# Patient Record
Sex: MALE | Race: OTHER
[De-identification: names, ages, dates, MRNs, and addresses within clinical notes are randomized per-mention and may not be internally consistent; named-entity substitution may affect disease eponyms.]

---

## 2018-06-18 ENCOUNTER — HOSPITAL ENCOUNTER (INPATIENT)
Dept: HOSPITAL 15 - ER | Age: 83
LOS: 3 days | Discharge: HOME | DRG: 291 | End: 2018-06-21
Attending: INTERNAL MEDICINE | Admitting: INTERNAL MEDICINE
Payer: MEDICARE

## 2018-06-18 VITALS — DIASTOLIC BLOOD PRESSURE: 74 MMHG | SYSTOLIC BLOOD PRESSURE: 131 MMHG

## 2018-06-18 VITALS — WEIGHT: 98.99 LBS | BODY MASS INDEX: 19.43 KG/M2 | HEIGHT: 60 IN

## 2018-06-18 VITALS — SYSTOLIC BLOOD PRESSURE: 141 MMHG | DIASTOLIC BLOOD PRESSURE: 67 MMHG

## 2018-06-18 DIAGNOSIS — I69.351: ICD-10-CM

## 2018-06-18 DIAGNOSIS — I25.2: ICD-10-CM

## 2018-06-18 DIAGNOSIS — I70.0: ICD-10-CM

## 2018-06-18 DIAGNOSIS — D63.8: ICD-10-CM

## 2018-06-18 DIAGNOSIS — N18.3: ICD-10-CM

## 2018-06-18 DIAGNOSIS — J96.00: ICD-10-CM

## 2018-06-18 DIAGNOSIS — R00.1: ICD-10-CM

## 2018-06-18 DIAGNOSIS — E44.0: ICD-10-CM

## 2018-06-18 DIAGNOSIS — Z88.2: ICD-10-CM

## 2018-06-18 DIAGNOSIS — E78.5: ICD-10-CM

## 2018-06-18 DIAGNOSIS — N39.0: ICD-10-CM

## 2018-06-18 DIAGNOSIS — Z88.8: ICD-10-CM

## 2018-06-18 DIAGNOSIS — Z95.5: ICD-10-CM

## 2018-06-18 DIAGNOSIS — Z79.899: ICD-10-CM

## 2018-06-18 DIAGNOSIS — I25.5: ICD-10-CM

## 2018-06-18 DIAGNOSIS — I50.43: Primary | ICD-10-CM

## 2018-06-18 DIAGNOSIS — I73.9: ICD-10-CM

## 2018-06-18 DIAGNOSIS — Z79.82: ICD-10-CM

## 2018-06-18 DIAGNOSIS — I25.10: ICD-10-CM

## 2018-06-18 DIAGNOSIS — B96.20: ICD-10-CM

## 2018-06-18 DIAGNOSIS — J44.1: ICD-10-CM

## 2018-06-18 LAB
ALBUMIN SERPL-MCNC: 3.3 G/DL (ref 3.4–5)
ALP SERPL-CCNC: 122 U/L (ref 45–117)
ALT SERPL-CCNC: 48 U/L (ref 16–61)
ANION GAP SERPL CALCULATED.3IONS-SCNC: 10 MMOL/L (ref 5–15)
APTT PPP: 32.3 SEC (ref 23.78–33.04)
BILIRUB SERPL-MCNC: 0.5 MG/DL (ref 0.2–1)
BUN SERPL-MCNC: 16 MG/DL (ref 7–18)
BUN/CREAT SERPL: 12.4
CALCIUM SERPL-MCNC: 8.7 MG/DL (ref 8.5–10.1)
CHLORIDE SERPL-SCNC: 110 MMOL/L (ref 98–107)
CO2 SERPL-SCNC: 22 MMOL/L (ref 21–32)
GLUCOSE SERPL-MCNC: 97 MG/DL (ref 74–106)
HCT VFR BLD AUTO: 37.9 % (ref 41–53)
HGB BLD-MCNC: 12.5 G/DL (ref 13.5–17.5)
INR PPP: 1.04 (ref 0.9–1.15)
MCH RBC QN AUTO: 29 PG (ref 28–32)
MCV RBC AUTO: 88.2 FL (ref 80–100)
NRBC BLD QL AUTO: 0.1 %
POTASSIUM SERPL-SCNC: 4.1 MMOL/L (ref 3.5–5.1)
PROT SERPL-MCNC: 7.3 G/DL (ref 6.4–8.2)
PROTHROMBIN TIME: 11.1 SEC (ref 9.27–12.13)
SODIUM SERPL-SCNC: 142 MMOL/L (ref 136–145)

## 2018-06-18 PROCEDURE — 81001 URINALYSIS AUTO W/SCOPE: CPT

## 2018-06-18 PROCEDURE — 71045 X-RAY EXAM CHEST 1 VIEW: CPT

## 2018-06-18 PROCEDURE — 85025 COMPLETE CBC W/AUTO DIFF WBC: CPT

## 2018-06-18 PROCEDURE — 93306 TTE W/DOPPLER COMPLETE: CPT

## 2018-06-18 PROCEDURE — 87088 URINE BACTERIA CULTURE: CPT

## 2018-06-18 PROCEDURE — 87086 URINE CULTURE/COLONY COUNT: CPT

## 2018-06-18 PROCEDURE — 36600 WITHDRAWAL OF ARTERIAL BLOOD: CPT

## 2018-06-18 PROCEDURE — 93005 ELECTROCARDIOGRAM TRACING: CPT

## 2018-06-18 PROCEDURE — 94640 AIRWAY INHALATION TREATMENT: CPT

## 2018-06-18 PROCEDURE — 87186 SC STD MICRODIL/AGAR DIL: CPT

## 2018-06-18 PROCEDURE — 85610 PROTHROMBIN TIME: CPT

## 2018-06-18 PROCEDURE — 78582 LUNG VENTILAT&PERFUS IMAGING: CPT

## 2018-06-18 PROCEDURE — 96375 TX/PRO/DX INJ NEW DRUG ADDON: CPT

## 2018-06-18 PROCEDURE — 80048 BASIC METABOLIC PNL TOTAL CA: CPT

## 2018-06-18 PROCEDURE — 36415 COLL VENOUS BLD VENIPUNCTURE: CPT

## 2018-06-18 PROCEDURE — 71046 X-RAY EXAM CHEST 2 VIEWS: CPT

## 2018-06-18 PROCEDURE — 83880 ASSAY OF NATRIURETIC PEPTIDE: CPT

## 2018-06-18 PROCEDURE — 85730 THROMBOPLASTIN TIME PARTIAL: CPT

## 2018-06-18 PROCEDURE — 82805 BLOOD GASES W/O2 SATURATION: CPT

## 2018-06-18 PROCEDURE — 80053 COMPREHEN METABOLIC PANEL: CPT

## 2018-06-18 PROCEDURE — 84484 ASSAY OF TROPONIN QUANT: CPT

## 2018-06-18 PROCEDURE — 96365 THER/PROPH/DIAG IV INF INIT: CPT

## 2018-06-18 RX ADMIN — SPIRONOLACTONE SCH MG: 25 TABLET, FILM COATED ORAL at 22:12

## 2018-06-18 RX ADMIN — ATORVASTATIN CALCIUM SCH MG: 20 TABLET, FILM COATED ORAL at 22:13

## 2018-06-18 RX ADMIN — SODIUM CHLORIDE SCH ML: 9 INJECTION INTRAMUSCULAR; INTRAVENOUS; SUBCUTANEOUS at 22:12

## 2018-06-18 RX ADMIN — TICAGRELOR SCH MG: 90 TABLET ORAL at 22:13

## 2018-06-18 RX ADMIN — ENALAPRIL MALEATE SCH MG: 2.5 TABLET ORAL at 22:13

## 2018-06-19 VITALS — DIASTOLIC BLOOD PRESSURE: 75 MMHG | SYSTOLIC BLOOD PRESSURE: 131 MMHG

## 2018-06-19 VITALS — DIASTOLIC BLOOD PRESSURE: 70 MMHG | SYSTOLIC BLOOD PRESSURE: 128 MMHG

## 2018-06-19 VITALS — DIASTOLIC BLOOD PRESSURE: 63 MMHG | SYSTOLIC BLOOD PRESSURE: 128 MMHG

## 2018-06-19 VITALS — SYSTOLIC BLOOD PRESSURE: 122 MMHG | DIASTOLIC BLOOD PRESSURE: 59 MMHG

## 2018-06-19 LAB
ALBUMIN SERPL-MCNC: 3.2 G/DL (ref 3.4–5)
ALP SERPL-CCNC: 108 U/L (ref 45–117)
ALT SERPL-CCNC: 39 U/L (ref 16–61)
ANION GAP SERPL CALCULATED.3IONS-SCNC: 10 MMOL/L (ref 5–15)
ANION GAP SERPL CALCULATED.3IONS-SCNC: 10 MMOL/L (ref 5–15)
BILIRUB SERPL-MCNC: 0.6 MG/DL (ref 0.2–1)
BUN SERPL-MCNC: 17 MG/DL (ref 7–18)
BUN SERPL-MCNC: 18 MG/DL (ref 7–18)
BUN/CREAT SERPL: 14
BUN/CREAT SERPL: 15.2
CALCIUM SERPL-MCNC: 8.3 MG/DL (ref 8.5–10.1)
CALCIUM SERPL-MCNC: 8.5 MG/DL (ref 8.5–10.1)
CHLORIDE SERPL-SCNC: 106 MMOL/L (ref 98–107)
CHLORIDE SERPL-SCNC: 109 MMOL/L (ref 98–107)
CO2 SERPL-SCNC: 22 MMOL/L (ref 21–32)
CO2 SERPL-SCNC: 23 MMOL/L (ref 21–32)
GLUCOSE SERPL-MCNC: 89 MG/DL (ref 74–106)
GLUCOSE SERPL-MCNC: 90 MG/DL (ref 74–106)
HCT VFR BLD AUTO: 35.6 % (ref 41–53)
HGB BLD-MCNC: 12 G/DL (ref 13.5–17.5)
MCH RBC QN AUTO: 29.3 PG (ref 28–32)
MCV RBC AUTO: 87.3 FL (ref 80–100)
NRBC BLD QL AUTO: 0 %
POTASSIUM SERPL-SCNC: 3.6 MMOL/L (ref 3.5–5.1)
POTASSIUM SERPL-SCNC: 3.8 MMOL/L (ref 3.5–5.1)
PROT SERPL-MCNC: 6.6 G/DL (ref 6.4–8.2)
SODIUM SERPL-SCNC: 139 MMOL/L (ref 136–145)
SODIUM SERPL-SCNC: 141 MMOL/L (ref 136–145)

## 2018-06-19 RX ADMIN — IPRATROPIUM BROMIDE SCH MG: 0.5 SOLUTION RESPIRATORY (INHALATION) at 19:37

## 2018-06-19 RX ADMIN — SPIRONOLACTONE SCH MG: 25 TABLET, FILM COATED ORAL at 10:44

## 2018-06-19 RX ADMIN — ATORVASTATIN CALCIUM SCH MG: 20 TABLET, FILM COATED ORAL at 22:55

## 2018-06-19 RX ADMIN — SODIUM CHLORIDE SCH ML: 9 INJECTION INTRAMUSCULAR; INTRAVENOUS; SUBCUTANEOUS at 13:51

## 2018-06-19 RX ADMIN — SODIUM CHLORIDE SCH ML: 9 INJECTION INTRAMUSCULAR; INTRAVENOUS; SUBCUTANEOUS at 05:45

## 2018-06-19 RX ADMIN — ENALAPRIL MALEATE SCH MG: 2.5 TABLET ORAL at 10:44

## 2018-06-19 RX ADMIN — BUDESONIDE SCH MG: 0.5 SUSPENSION RESPIRATORY (INHALATION) at 19:37

## 2018-06-19 RX ADMIN — ENALAPRIL MALEATE SCH MG: 2.5 TABLET ORAL at 22:55

## 2018-06-19 RX ADMIN — ALBUTEROL SULFATE SCH MG: 2.5 SOLUTION RESPIRATORY (INHALATION) at 19:36

## 2018-06-19 RX ADMIN — TICAGRELOR SCH MG: 90 TABLET ORAL at 22:00

## 2018-06-19 RX ADMIN — SODIUM CHLORIDE SCH ML: 9 INJECTION INTRAMUSCULAR; INTRAVENOUS; SUBCUTANEOUS at 22:56

## 2018-06-19 RX ADMIN — ASPIRIN SCH MG: 81 TABLET ORAL at 10:44

## 2018-06-19 RX ADMIN — POTASSIUM CHLORIDE SCH MEQ: 750 TABLET, FILM COATED, EXTENDED RELEASE ORAL at 10:43

## 2018-06-19 RX ADMIN — TICAGRELOR SCH MG: 90 TABLET ORAL at 10:44

## 2018-06-19 RX ADMIN — MULTIVITAMIN TABLET SCH TAB: TABLET at 10:44

## 2018-06-19 RX ADMIN — SPIRONOLACTONE SCH MG: 25 TABLET, FILM COATED ORAL at 22:56

## 2018-06-20 VITALS — DIASTOLIC BLOOD PRESSURE: 72 MMHG | SYSTOLIC BLOOD PRESSURE: 134 MMHG

## 2018-06-20 VITALS — DIASTOLIC BLOOD PRESSURE: 54 MMHG | SYSTOLIC BLOOD PRESSURE: 119 MMHG

## 2018-06-20 VITALS — SYSTOLIC BLOOD PRESSURE: 130 MMHG | DIASTOLIC BLOOD PRESSURE: 56 MMHG

## 2018-06-20 VITALS — SYSTOLIC BLOOD PRESSURE: 115 MMHG | DIASTOLIC BLOOD PRESSURE: 24 MMHG

## 2018-06-20 VITALS — SYSTOLIC BLOOD PRESSURE: 124 MMHG | DIASTOLIC BLOOD PRESSURE: 53 MMHG

## 2018-06-20 VITALS — DIASTOLIC BLOOD PRESSURE: 64 MMHG | SYSTOLIC BLOOD PRESSURE: 117 MMHG

## 2018-06-20 LAB
ANION GAP SERPL CALCULATED.3IONS-SCNC: 12 MMOL/L (ref 5–15)
BUN SERPL-MCNC: 17 MG/DL (ref 7–18)
BUN/CREAT SERPL: 12
CALCIUM SERPL-MCNC: 8.7 MG/DL (ref 8.5–10.1)
CHLORIDE SERPL-SCNC: 104 MMOL/L (ref 98–107)
CO2 SERPL-SCNC: 22 MMOL/L (ref 21–32)
GLUCOSE SERPL-MCNC: 173 MG/DL (ref 74–106)
HCT VFR BLD AUTO: 37.1 % (ref 41–53)
HGB BLD-MCNC: 12.6 G/DL (ref 13.5–17.5)
MCH RBC QN AUTO: 29.8 PG (ref 28–32)
MCV RBC AUTO: 88 FL (ref 80–100)
NRBC BLD QL AUTO: 0 %
POTASSIUM SERPL-SCNC: 3.7 MMOL/L (ref 3.5–5.1)
SODIUM SERPL-SCNC: 138 MMOL/L (ref 136–145)

## 2018-06-20 RX ADMIN — CEFTRIAXONE SODIUM SCH MLS/HR: 1 INJECTION, POWDER, FOR SOLUTION INTRAMUSCULAR; INTRAVENOUS at 10:06

## 2018-06-20 RX ADMIN — SODIUM CHLORIDE SCH ML: 9 INJECTION INTRAMUSCULAR; INTRAVENOUS; SUBCUTANEOUS at 21:59

## 2018-06-20 RX ADMIN — IPRATROPIUM BROMIDE SCH MG: 0.5 SOLUTION RESPIRATORY (INHALATION) at 11:25

## 2018-06-20 RX ADMIN — MULTIVITAMIN TABLET SCH TAB: TABLET at 10:09

## 2018-06-20 RX ADMIN — TICAGRELOR SCH MG: 90 TABLET ORAL at 10:09

## 2018-06-20 RX ADMIN — ALBUTEROL SULFATE SCH MG: 2.5 SOLUTION RESPIRATORY (INHALATION) at 05:52

## 2018-06-20 RX ADMIN — SPIRONOLACTONE SCH MG: 25 TABLET, FILM COATED ORAL at 21:59

## 2018-06-20 RX ADMIN — IPRATROPIUM BROMIDE SCH MG: 0.5 SOLUTION RESPIRATORY (INHALATION) at 19:48

## 2018-06-20 RX ADMIN — ALBUTEROL SULFATE SCH MG: 2.5 SOLUTION RESPIRATORY (INHALATION) at 19:47

## 2018-06-20 RX ADMIN — SODIUM CHLORIDE SCH ML: 9 INJECTION INTRAMUSCULAR; INTRAVENOUS; SUBCUTANEOUS at 06:00

## 2018-06-20 RX ADMIN — SPIRONOLACTONE SCH MG: 25 TABLET, FILM COATED ORAL at 10:09

## 2018-06-20 RX ADMIN — IPRATROPIUM BROMIDE SCH MG: 0.5 SOLUTION RESPIRATORY (INHALATION) at 05:52

## 2018-06-20 RX ADMIN — IPRATROPIUM BROMIDE SCH MG: 0.5 SOLUTION RESPIRATORY (INHALATION) at 00:35

## 2018-06-20 RX ADMIN — SODIUM CHLORIDE SCH ML: 9 INJECTION INTRAMUSCULAR; INTRAVENOUS; SUBCUTANEOUS at 15:17

## 2018-06-20 RX ADMIN — ALBUTEROL SULFATE SCH MG: 2.5 SOLUTION RESPIRATORY (INHALATION) at 00:35

## 2018-06-20 RX ADMIN — TICAGRELOR SCH MG: 90 TABLET ORAL at 22:00

## 2018-06-20 RX ADMIN — ENALAPRIL MALEATE SCH MG: 2.5 TABLET ORAL at 10:07

## 2018-06-20 RX ADMIN — BUDESONIDE SCH MG: 0.5 SUSPENSION RESPIRATORY (INHALATION) at 19:48

## 2018-06-20 RX ADMIN — POTASSIUM CHLORIDE SCH MEQ: 750 TABLET, FILM COATED, EXTENDED RELEASE ORAL at 10:08

## 2018-06-20 RX ADMIN — ATORVASTATIN CALCIUM SCH MG: 20 TABLET, FILM COATED ORAL at 21:59

## 2018-06-20 RX ADMIN — BUDESONIDE SCH MG: 0.5 SUSPENSION RESPIRATORY (INHALATION) at 05:53

## 2018-06-20 RX ADMIN — ASPIRIN SCH MG: 81 TABLET ORAL at 10:09

## 2018-06-20 RX ADMIN — ENALAPRIL MALEATE SCH MG: 2.5 TABLET ORAL at 22:00

## 2018-06-20 RX ADMIN — ALBUTEROL SULFATE SCH MG: 2.5 SOLUTION RESPIRATORY (INHALATION) at 11:25

## 2018-06-21 VITALS — DIASTOLIC BLOOD PRESSURE: 66 MMHG | SYSTOLIC BLOOD PRESSURE: 125 MMHG

## 2018-06-21 VITALS — SYSTOLIC BLOOD PRESSURE: 126 MMHG | DIASTOLIC BLOOD PRESSURE: 62 MMHG

## 2018-06-21 VITALS — DIASTOLIC BLOOD PRESSURE: 64 MMHG | SYSTOLIC BLOOD PRESSURE: 119 MMHG

## 2018-06-21 VITALS — SYSTOLIC BLOOD PRESSURE: 125 MMHG | DIASTOLIC BLOOD PRESSURE: 66 MMHG

## 2018-06-21 LAB
ANION GAP SERPL CALCULATED.3IONS-SCNC: 10 MMOL/L (ref 5–15)
BUN SERPL-MCNC: 24 MG/DL (ref 7–18)
BUN/CREAT SERPL: 19.7
CALCIUM SERPL-MCNC: 8.7 MG/DL (ref 8.5–10.1)
CHLORIDE SERPL-SCNC: 106 MMOL/L (ref 98–107)
CO2 SERPL-SCNC: 21 MMOL/L (ref 21–32)
GLUCOSE SERPL-MCNC: 138 MG/DL (ref 74–106)
HCT VFR BLD AUTO: 34.3 % (ref 41–53)
HGB BLD-MCNC: 11.6 G/DL (ref 13.5–17.5)
MCH RBC QN AUTO: 29.2 PG (ref 28–32)
MCV RBC AUTO: 86.3 FL (ref 80–100)
NRBC BLD QL AUTO: 0 %
POTASSIUM SERPL-SCNC: 4 MMOL/L (ref 3.5–5.1)
SODIUM SERPL-SCNC: 137 MMOL/L (ref 136–145)

## 2018-06-21 RX ADMIN — SODIUM CHLORIDE SCH ML: 9 INJECTION INTRAMUSCULAR; INTRAVENOUS; SUBCUTANEOUS at 06:23

## 2018-06-21 RX ADMIN — ENALAPRIL MALEATE SCH MG: 2.5 TABLET ORAL at 09:26

## 2018-06-21 RX ADMIN — BUDESONIDE SCH MG: 0.5 SUSPENSION RESPIRATORY (INHALATION) at 07:11

## 2018-06-21 RX ADMIN — MULTIVITAMIN TABLET SCH TAB: TABLET at 09:27

## 2018-06-21 RX ADMIN — SODIUM CHLORIDE SCH ML: 9 INJECTION INTRAMUSCULAR; INTRAVENOUS; SUBCUTANEOUS at 09:25

## 2018-06-21 RX ADMIN — ALBUTEROL SULFATE SCH MG: 2.5 SOLUTION RESPIRATORY (INHALATION) at 12:26

## 2018-06-21 RX ADMIN — TICAGRELOR SCH MG: 90 TABLET ORAL at 09:28

## 2018-06-21 RX ADMIN — SPIRONOLACTONE SCH MG: 25 TABLET, FILM COATED ORAL at 09:27

## 2018-06-21 RX ADMIN — IPRATROPIUM BROMIDE SCH MG: 0.5 SOLUTION RESPIRATORY (INHALATION) at 12:27

## 2018-06-21 RX ADMIN — CEFTRIAXONE SODIUM SCH MLS/HR: 1 INJECTION, POWDER, FOR SOLUTION INTRAMUSCULAR; INTRAVENOUS at 09:26

## 2018-06-21 RX ADMIN — ALBUTEROL SULFATE SCH MG: 2.5 SOLUTION RESPIRATORY (INHALATION) at 07:10

## 2018-06-21 RX ADMIN — IPRATROPIUM BROMIDE SCH MG: 0.5 SOLUTION RESPIRATORY (INHALATION) at 07:10

## 2018-06-21 RX ADMIN — ASPIRIN SCH MG: 81 TABLET ORAL at 09:27

## 2018-06-21 RX ADMIN — POTASSIUM CHLORIDE SCH MEQ: 750 TABLET, FILM COATED, EXTENDED RELEASE ORAL at 09:27

## 2018-07-03 ENCOUNTER — HOSPITAL ENCOUNTER (OUTPATIENT)
Dept: HOSPITAL 15 - LAB | Age: 83
Discharge: HOME | End: 2018-07-03
Attending: INTERNAL MEDICINE
Payer: MEDICARE

## 2018-07-03 DIAGNOSIS — Z79.899: ICD-10-CM

## 2018-07-03 DIAGNOSIS — I50.43: ICD-10-CM

## 2018-07-03 DIAGNOSIS — I25.10: ICD-10-CM

## 2018-07-03 DIAGNOSIS — I25.5: Primary | ICD-10-CM

## 2018-07-03 DIAGNOSIS — N18.3: ICD-10-CM

## 2018-07-03 DIAGNOSIS — E78.5: ICD-10-CM

## 2018-07-03 LAB
ANION GAP SERPL CALCULATED.3IONS-SCNC: 7 MMOL/L (ref 5–15)
BUN SERPL-MCNC: 24 MG/DL (ref 7–18)
BUN/CREAT SERPL: 22.4
CALCIUM SERPL-MCNC: 9 MG/DL (ref 8.5–10.1)
CHLORIDE SERPL-SCNC: 104 MMOL/L (ref 98–107)
CO2 SERPL-SCNC: 29 MMOL/L (ref 21–32)
GLUCOSE SERPL-MCNC: 86 MG/DL (ref 74–106)
POTASSIUM SERPL-SCNC: 3.5 MMOL/L (ref 3.5–5.1)
SODIUM SERPL-SCNC: 140 MMOL/L (ref 136–145)

## 2018-07-03 PROCEDURE — 36415 COLL VENOUS BLD VENIPUNCTURE: CPT

## 2018-07-03 PROCEDURE — 83880 ASSAY OF NATRIURETIC PEPTIDE: CPT

## 2018-07-03 PROCEDURE — 80048 BASIC METABOLIC PNL TOTAL CA: CPT

## 2018-09-06 ENCOUNTER — HOSPITAL ENCOUNTER (OUTPATIENT)
Dept: HOSPITAL 15 - RT | Age: 83
Discharge: HOME | End: 2018-09-06
Attending: INTERNAL MEDICINE
Payer: MEDICARE

## 2018-09-06 DIAGNOSIS — J44.9: Primary | ICD-10-CM

## 2018-09-06 PROCEDURE — 94060 EVALUATION OF WHEEZING: CPT

## 2018-09-17 ENCOUNTER — HOSPITAL ENCOUNTER (OUTPATIENT)
Dept: HOSPITAL 15 - LAB | Age: 83
Discharge: HOME | End: 2018-09-17
Attending: INTERNAL MEDICINE
Payer: MEDICARE

## 2018-09-17 DIAGNOSIS — I70.218: Primary | ICD-10-CM

## 2018-09-17 DIAGNOSIS — Z88.2: ICD-10-CM

## 2018-09-17 DIAGNOSIS — I25.5: ICD-10-CM

## 2018-09-17 DIAGNOSIS — J44.9: ICD-10-CM

## 2018-09-17 DIAGNOSIS — Z88.0: ICD-10-CM

## 2018-09-17 LAB
ALBUMIN SERPL-MCNC: 3.4 G/DL (ref 3.4–5)
ALP SERPL-CCNC: 125 U/L (ref 45–117)
ALT SERPL-CCNC: 27 U/L (ref 16–61)
BILIRUB DIRECT SERPL-MCNC: < 0.1 MG/DL (ref 0–0.2)
BILIRUB SERPL-MCNC: 0.3 MG/DL (ref 0.2–1)
PROT SERPL-MCNC: 7.2 G/DL (ref 6.4–8.2)

## 2018-09-17 PROCEDURE — 36415 COLL VENOUS BLD VENIPUNCTURE: CPT

## 2018-09-17 PROCEDURE — 80076 HEPATIC FUNCTION PANEL: CPT

## 2018-10-18 ENCOUNTER — HOSPITAL ENCOUNTER (OUTPATIENT)
Dept: HOSPITAL 15 - XY | Age: 83
Discharge: HOME | End: 2018-10-18
Attending: INTERNAL MEDICINE
Payer: MEDICARE

## 2018-10-18 DIAGNOSIS — I73.9: ICD-10-CM

## 2018-10-18 DIAGNOSIS — I77.1: Primary | ICD-10-CM

## 2018-10-18 PROCEDURE — 93925 LOWER EXTREMITY STUDY: CPT

## 2018-10-31 ENCOUNTER — HOSPITAL ENCOUNTER (INPATIENT)
Dept: HOSPITAL 15 - CATH | Age: 83
LOS: 3 days | Discharge: HOME | DRG: 271 | End: 2018-11-03
Attending: INTERNAL MEDICINE | Admitting: INTERNAL MEDICINE
Payer: MEDICARE

## 2018-10-31 VITALS — WEIGHT: 59.08 LBS | BODY MASS INDEX: 11.6 KG/M2 | HEIGHT: 60 IN

## 2018-10-31 VITALS — DIASTOLIC BLOOD PRESSURE: 60 MMHG | SYSTOLIC BLOOD PRESSURE: 118 MMHG

## 2018-10-31 VITALS — DIASTOLIC BLOOD PRESSURE: 96 MMHG | SYSTOLIC BLOOD PRESSURE: 154 MMHG

## 2018-10-31 DIAGNOSIS — Z88.0: ICD-10-CM

## 2018-10-31 DIAGNOSIS — N18.9: ICD-10-CM

## 2018-10-31 DIAGNOSIS — I74.3: ICD-10-CM

## 2018-10-31 DIAGNOSIS — I70.212: ICD-10-CM

## 2018-10-31 DIAGNOSIS — Z88.2: ICD-10-CM

## 2018-10-31 DIAGNOSIS — J45.909: ICD-10-CM

## 2018-10-31 DIAGNOSIS — I25.10: ICD-10-CM

## 2018-10-31 DIAGNOSIS — I99.8: ICD-10-CM

## 2018-10-31 DIAGNOSIS — I70.211: Primary | ICD-10-CM

## 2018-10-31 DIAGNOSIS — I12.9: ICD-10-CM

## 2018-10-31 PROCEDURE — 04CL3ZZ EXTIRPATION OF MATTER FROM LEFT FEMORAL ARTERY, PERCUTANEOUS APPROACH: ICD-10-PCS | Performed by: INTERNAL MEDICINE

## 2018-10-31 PROCEDURE — 86850 RBC ANTIBODY SCREEN: CPT

## 2018-10-31 PROCEDURE — B41F1ZZ FLUOROSCOPY OF RIGHT LOWER EXTREMITY ARTERIES USING LOW OSMOLAR CONTRAST: ICD-10-PCS | Performed by: INTERNAL MEDICINE

## 2018-10-31 PROCEDURE — 04CN3ZZ EXTIRPATION OF MATTER FROM LEFT POPLITEAL ARTERY, PERCUTANEOUS APPROACH: ICD-10-PCS | Performed by: INTERNAL MEDICINE

## 2018-10-31 PROCEDURE — 85610 PROTHROMBIN TIME: CPT

## 2018-10-31 PROCEDURE — 80053 COMPREHEN METABOLIC PANEL: CPT

## 2018-10-31 PROCEDURE — 047L3D1 DILATION OF LEFT FEMORAL ARTERY WITH INTRALUMINAL DEVICE, USING DRUG-COATED BALLOON, PERCUTANEOUS APPROACH: ICD-10-PCS | Performed by: INTERNAL MEDICINE

## 2018-10-31 PROCEDURE — 85025 COMPLETE CBC W/AUTO DIFF WBC: CPT

## 2018-10-31 PROCEDURE — 81001 URINALYSIS AUTO W/SCOPE: CPT

## 2018-10-31 PROCEDURE — 71045 X-RAY EXAM CHEST 1 VIEW: CPT

## 2018-10-31 PROCEDURE — 36415 COLL VENOUS BLD VENIPUNCTURE: CPT

## 2018-10-31 PROCEDURE — 99152 MOD SED SAME PHYS/QHP 5/>YRS: CPT

## 2018-10-31 PROCEDURE — B41G1ZZ FLUOROSCOPY OF LEFT LOWER EXTREMITY ARTERIES USING LOW OSMOLAR CONTRAST: ICD-10-PCS | Performed by: INTERNAL MEDICINE

## 2018-10-31 PROCEDURE — 86901 BLOOD TYPING SEROLOGIC RH(D): CPT

## 2018-10-31 PROCEDURE — 80048 BASIC METABOLIC PNL TOTAL CA: CPT

## 2018-10-31 PROCEDURE — 04CS3ZZ EXTIRPATION OF MATTER FROM LEFT POSTERIOR TIBIAL ARTERY, PERCUTANEOUS APPROACH: ICD-10-PCS | Performed by: INTERNAL MEDICINE

## 2018-10-31 PROCEDURE — 86900 BLOOD TYPING SEROLOGIC ABO: CPT

## 2018-10-31 PROCEDURE — 047N3D1 DILATION OF LEFT POPLITEAL ARTERY WITH INTRALUMINAL DEVICE, USING DRUG-COATED BALLOON, PERCUTANEOUS APPROACH: ICD-10-PCS | Performed by: INTERNAL MEDICINE

## 2018-10-31 RX ADMIN — SPIRONOLACTONE SCH MG: 25 TABLET, FILM COATED ORAL at 23:45

## 2018-10-31 RX ADMIN — ATORVASTATIN CALCIUM SCH MG: 20 TABLET, FILM COATED ORAL at 23:45

## 2018-10-31 RX ADMIN — ENALAPRIL MALEATE SCH MG: 2.5 TABLET ORAL at 23:45

## 2018-11-01 VITALS — SYSTOLIC BLOOD PRESSURE: 124 MMHG | DIASTOLIC BLOOD PRESSURE: 66 MMHG

## 2018-11-01 VITALS — DIASTOLIC BLOOD PRESSURE: 61 MMHG | SYSTOLIC BLOOD PRESSURE: 139 MMHG

## 2018-11-01 VITALS — DIASTOLIC BLOOD PRESSURE: 66 MMHG | SYSTOLIC BLOOD PRESSURE: 132 MMHG

## 2018-11-01 VITALS — SYSTOLIC BLOOD PRESSURE: 139 MMHG | DIASTOLIC BLOOD PRESSURE: 61 MMHG

## 2018-11-01 VITALS — DIASTOLIC BLOOD PRESSURE: 56 MMHG | SYSTOLIC BLOOD PRESSURE: 116 MMHG

## 2018-11-01 VITALS — DIASTOLIC BLOOD PRESSURE: 63 MMHG | SYSTOLIC BLOOD PRESSURE: 120 MMHG

## 2018-11-01 LAB
ANION GAP SERPL CALCULATED.3IONS-SCNC: 9 MMOL/L (ref 5–15)
BUN SERPL-MCNC: 17 MG/DL (ref 7–18)
BUN/CREAT SERPL: 15.2
CALCIUM SERPL-MCNC: 8.6 MG/DL (ref 8.5–10.1)
CHLORIDE SERPL-SCNC: 111 MMOL/L (ref 98–107)
CO2 SERPL-SCNC: 21 MMOL/L (ref 21–32)
GLUCOSE SERPL-MCNC: 84 MG/DL (ref 74–106)
HCT VFR BLD AUTO: 39.5 % (ref 41–53)
HGB BLD-MCNC: 12.7 G/DL (ref 13.5–17.5)
MCH RBC QN AUTO: 27.5 PG (ref 28–32)
MCV RBC AUTO: 85.5 FL (ref 80–100)
NRBC BLD QL AUTO: 0 %
POTASSIUM SERPL-SCNC: 3.6 MMOL/L (ref 3.5–5.1)
SODIUM SERPL-SCNC: 141 MMOL/L (ref 136–145)

## 2018-11-01 RX ADMIN — ATORVASTATIN CALCIUM SCH MG: 20 TABLET, FILM COATED ORAL at 23:27

## 2018-11-01 RX ADMIN — SPIRONOLACTONE SCH MG: 25 TABLET, FILM COATED ORAL at 23:27

## 2018-11-01 RX ADMIN — SPIRONOLACTONE SCH MG: 25 TABLET, FILM COATED ORAL at 10:00

## 2018-11-01 RX ADMIN — ENALAPRIL MALEATE SCH MG: 2.5 TABLET ORAL at 23:28

## 2018-11-01 RX ADMIN — CLOPIDOGREL BISULFATE SCH MG: 75 TABLET ORAL at 09:59

## 2018-11-01 RX ADMIN — ENALAPRIL MALEATE SCH MG: 2.5 TABLET ORAL at 10:00

## 2018-11-02 VITALS — SYSTOLIC BLOOD PRESSURE: 114 MMHG | DIASTOLIC BLOOD PRESSURE: 54 MMHG

## 2018-11-02 VITALS — SYSTOLIC BLOOD PRESSURE: 145 MMHG | DIASTOLIC BLOOD PRESSURE: 67 MMHG

## 2018-11-02 VITALS — DIASTOLIC BLOOD PRESSURE: 54 MMHG | SYSTOLIC BLOOD PRESSURE: 91 MMHG

## 2018-11-02 VITALS — DIASTOLIC BLOOD PRESSURE: 55 MMHG | SYSTOLIC BLOOD PRESSURE: 109 MMHG

## 2018-11-02 VITALS — SYSTOLIC BLOOD PRESSURE: 109 MMHG | DIASTOLIC BLOOD PRESSURE: 55 MMHG

## 2018-11-02 VITALS — DIASTOLIC BLOOD PRESSURE: 48 MMHG | SYSTOLIC BLOOD PRESSURE: 108 MMHG

## 2018-11-02 LAB
ALBUMIN SERPL-MCNC: 3.1 G/DL (ref 3.4–5)
ALP SERPL-CCNC: 104 U/L (ref 45–117)
ALT SERPL-CCNC: 17 U/L (ref 16–61)
ANION GAP SERPL CALCULATED.3IONS-SCNC: 9 MMOL/L (ref 5–15)
BILIRUB SERPL-MCNC: 0.6 MG/DL (ref 0.2–1)
BUN SERPL-MCNC: 14 MG/DL (ref 7–18)
BUN/CREAT SERPL: 13.1
CALCIUM SERPL-MCNC: 8.1 MG/DL (ref 8.5–10.1)
CHLORIDE SERPL-SCNC: 112 MMOL/L (ref 98–107)
CO2 SERPL-SCNC: 20 MMOL/L (ref 21–32)
GLUCOSE SERPL-MCNC: 85 MG/DL (ref 74–106)
HCT VFR BLD AUTO: 36.6 % (ref 41–53)
HGB BLD-MCNC: 12.2 G/DL (ref 13.5–17.5)
INR PPP: 1.04 (ref 0.9–1.15)
MCH RBC QN AUTO: 28.2 PG (ref 28–32)
MCV RBC AUTO: 84.8 FL (ref 80–100)
NRBC BLD QL AUTO: 0 %
POTASSIUM SERPL-SCNC: 3.6 MMOL/L (ref 3.5–5.1)
PROT SERPL-MCNC: 6.5 G/DL (ref 6.4–8.2)
PROTHROMBIN TIME: 11.1 SEC (ref 9.27–12.13)
SODIUM SERPL-SCNC: 141 MMOL/L (ref 136–145)

## 2018-11-02 PROCEDURE — 3E053PZ INTRODUCTION OF PLATELET INHIBITOR INTO PERIPHERAL ARTERY, PERCUTANEOUS APPROACH: ICD-10-PCS | Performed by: INTERNAL MEDICINE

## 2018-11-02 PROCEDURE — 04CK3ZZ EXTIRPATION OF MATTER FROM RIGHT FEMORAL ARTERY, PERCUTANEOUS APPROACH: ICD-10-PCS | Performed by: INTERNAL MEDICINE

## 2018-11-02 PROCEDURE — 047M3Z1 DILATION OF RIGHT POPLITEAL ARTERY USING DRUG-COATED BALLOON, PERCUTANEOUS APPROACH: ICD-10-PCS | Performed by: INTERNAL MEDICINE

## 2018-11-02 PROCEDURE — B41F1ZZ FLUOROSCOPY OF RIGHT LOWER EXTREMITY ARTERIES USING LOW OSMOLAR CONTRAST: ICD-10-PCS | Performed by: INTERNAL MEDICINE

## 2018-11-02 PROCEDURE — 04CC3ZZ EXTIRPATION OF MATTER FROM RIGHT COMMON ILIAC ARTERY, PERCUTANEOUS APPROACH: ICD-10-PCS | Performed by: INTERNAL MEDICINE

## 2018-11-02 PROCEDURE — 047K3Z1 DILATION OF RIGHT FEMORAL ARTERY USING DRUG-COATED BALLOON, PERCUTANEOUS APPROACH: ICD-10-PCS | Performed by: INTERNAL MEDICINE

## 2018-11-02 PROCEDURE — B41G1ZZ FLUOROSCOPY OF LEFT LOWER EXTREMITY ARTERIES USING LOW OSMOLAR CONTRAST: ICD-10-PCS | Performed by: INTERNAL MEDICINE

## 2018-11-02 PROCEDURE — 04CM3ZZ EXTIRPATION OF MATTER FROM RIGHT POPLITEAL ARTERY, PERCUTANEOUS APPROACH: ICD-10-PCS | Performed by: INTERNAL MEDICINE

## 2018-11-02 PROCEDURE — 047C3Z1 DILATION OF RIGHT COMMON ILIAC ARTERY USING DRUG-COATED BALLOON, PERCUTANEOUS APPROACH: ICD-10-PCS | Performed by: INTERNAL MEDICINE

## 2018-11-02 RX ADMIN — ATORVASTATIN CALCIUM SCH MG: 20 TABLET, FILM COATED ORAL at 21:18

## 2018-11-02 RX ADMIN — ENALAPRIL MALEATE SCH MG: 2.5 TABLET ORAL at 21:21

## 2018-11-02 RX ADMIN — SPIRONOLACTONE SCH MG: 25 TABLET, FILM COATED ORAL at 21:19

## 2018-11-02 RX ADMIN — CLOPIDOGREL BISULFATE SCH MG: 75 TABLET ORAL at 09:02

## 2018-11-02 RX ADMIN — SPIRONOLACTONE SCH MG: 25 TABLET, FILM COATED ORAL at 09:02

## 2018-11-02 RX ADMIN — ENALAPRIL MALEATE SCH MG: 2.5 TABLET ORAL at 09:02

## 2018-11-03 VITALS — SYSTOLIC BLOOD PRESSURE: 135 MMHG | DIASTOLIC BLOOD PRESSURE: 68 MMHG

## 2018-11-03 VITALS — SYSTOLIC BLOOD PRESSURE: 134 MMHG | DIASTOLIC BLOOD PRESSURE: 59 MMHG

## 2018-11-03 VITALS — DIASTOLIC BLOOD PRESSURE: 60 MMHG | SYSTOLIC BLOOD PRESSURE: 124 MMHG

## 2018-11-03 RX ADMIN — ENALAPRIL MALEATE SCH MG: 2.5 TABLET ORAL at 09:10

## 2018-11-03 RX ADMIN — SPIRONOLACTONE SCH MG: 25 TABLET, FILM COATED ORAL at 09:10

## 2018-11-03 RX ADMIN — CLOPIDOGREL BISULFATE SCH MG: 75 TABLET ORAL at 09:09

## 2018-11-21 ENCOUNTER — HOSPITAL ENCOUNTER (EMERGENCY)
Dept: HOSPITAL 15 - ER | Age: 83
Discharge: HOME | End: 2018-11-21
Payer: MEDICARE

## 2018-11-21 VITALS — DIASTOLIC BLOOD PRESSURE: 63 MMHG | SYSTOLIC BLOOD PRESSURE: 151 MMHG

## 2018-11-21 VITALS — WEIGHT: 100 LBS | BODY MASS INDEX: 19.63 KG/M2 | HEIGHT: 60 IN

## 2018-11-21 DIAGNOSIS — I25.2: ICD-10-CM

## 2018-11-21 DIAGNOSIS — M25.571: Primary | ICD-10-CM

## 2018-11-21 DIAGNOSIS — Z79.01: ICD-10-CM

## 2018-11-21 DIAGNOSIS — I50.9: ICD-10-CM

## 2018-11-21 DIAGNOSIS — Z86.73: ICD-10-CM

## 2018-11-21 DIAGNOSIS — Z88.0: ICD-10-CM

## 2018-11-21 DIAGNOSIS — Z79.82: ICD-10-CM

## 2018-11-21 DIAGNOSIS — Z88.2: ICD-10-CM

## 2018-11-21 DIAGNOSIS — I25.10: ICD-10-CM

## 2018-11-21 DIAGNOSIS — Z98.61: ICD-10-CM

## 2018-11-21 DIAGNOSIS — E78.5: ICD-10-CM

## 2018-11-21 DIAGNOSIS — Z79.899: ICD-10-CM

## 2018-11-21 DIAGNOSIS — Z87.891: ICD-10-CM

## 2018-11-21 PROCEDURE — 93971 EXTREMITY STUDY: CPT

## 2019-03-21 ENCOUNTER — HOSPITAL ENCOUNTER (OUTPATIENT)
Dept: HOSPITAL 15 - LAB | Age: 84
Discharge: HOME | End: 2019-03-21
Attending: INTERNAL MEDICINE
Payer: MEDICARE

## 2019-03-21 DIAGNOSIS — E78.5: ICD-10-CM

## 2019-03-21 DIAGNOSIS — I50.9: ICD-10-CM

## 2019-03-21 DIAGNOSIS — J44.9: Primary | ICD-10-CM

## 2019-03-21 DIAGNOSIS — I11.0: ICD-10-CM

## 2019-03-21 DIAGNOSIS — R79.89: ICD-10-CM

## 2019-03-21 LAB
ALBUMIN SERPL-MCNC: 3.9 G/DL (ref 3.4–5)
ALP SERPL-CCNC: 138 U/L (ref 45–117)
ALT SERPL-CCNC: 28 U/L (ref 16–61)
ANION GAP SERPL CALCULATED.3IONS-SCNC: 5 MMOL/L (ref 5–15)
BILIRUB SERPL-MCNC: 0.5 MG/DL (ref 0.2–1)
BUN SERPL-MCNC: 22 MG/DL (ref 7–18)
BUN/CREAT SERPL: 15.8
CALCIUM SERPL-MCNC: 9.4 MG/DL (ref 8.5–10.1)
CHLORIDE SERPL-SCNC: 109 MMOL/L (ref 98–107)
CHOLEST SERPL-MCNC: 95 MG/DL (ref ?–200)
CK SERPL-CCNC: 82 U/L (ref 39–308)
CO2 SERPL-SCNC: 25 MMOL/L (ref 21–32)
GLUCOSE SERPL-MCNC: 95 MG/DL (ref 74–106)
HDLC SERPL-MCNC: 56 MG/DL (ref 40–59)
POTASSIUM SERPL-SCNC: 5 MMOL/L (ref 3.5–5.1)
PROT SERPL-MCNC: 8 G/DL (ref 6.4–8.2)
SODIUM SERPL-SCNC: 139 MMOL/L (ref 136–145)
TRIGL SERPL-MCNC: 62 MG/DL (ref ?–150)

## 2019-03-21 PROCEDURE — 82550 ASSAY OF CK (CPK): CPT

## 2019-03-21 PROCEDURE — 83036 HEMOGLOBIN GLYCOSYLATED A1C: CPT

## 2019-03-21 PROCEDURE — 36415 COLL VENOUS BLD VENIPUNCTURE: CPT

## 2019-03-21 PROCEDURE — 80061 LIPID PANEL: CPT

## 2019-03-21 PROCEDURE — 80053 COMPREHEN METABOLIC PANEL: CPT

## 2019-04-03 ENCOUNTER — HOSPITAL ENCOUNTER (OUTPATIENT)
Dept: HOSPITAL 15 - LAB | Age: 84
Discharge: HOME | End: 2019-04-03
Attending: INTERNAL MEDICINE
Payer: MEDICARE

## 2019-04-03 DIAGNOSIS — E11.9: Primary | ICD-10-CM

## 2019-04-03 PROCEDURE — 82043 UR ALBUMIN QUANTITATIVE: CPT

## 2019-04-23 ENCOUNTER — HOSPITAL ENCOUNTER (OUTPATIENT)
Dept: HOSPITAL 15 - XY | Age: 84
Discharge: HOME | End: 2019-04-23
Attending: INTERNAL MEDICINE
Payer: MEDICARE

## 2019-04-23 DIAGNOSIS — I73.9: Primary | ICD-10-CM

## 2019-04-23 DIAGNOSIS — M79.605: ICD-10-CM

## 2019-04-23 PROCEDURE — 93925 LOWER EXTREMITY STUDY: CPT

## 2019-09-05 ENCOUNTER — HOSPITAL ENCOUNTER (OUTPATIENT)
Dept: HOSPITAL 15 - LAB | Age: 84
Discharge: HOME | End: 2019-09-05
Attending: INTERNAL MEDICINE
Payer: MEDICARE

## 2019-09-05 DIAGNOSIS — E11.9: Primary | ICD-10-CM

## 2019-09-05 LAB
ANION GAP SERPL CALCULATED.3IONS-SCNC: 9 MMOL/L (ref 5–15)
BUN SERPL-MCNC: 18 MG/DL (ref 7–18)
BUN/CREAT SERPL: 12.9
CALCIUM SERPL-MCNC: 9.3 MG/DL (ref 8.5–10.1)
CHLORIDE SERPL-SCNC: 108 MMOL/L (ref 98–107)
CO2 SERPL-SCNC: 22 MMOL/L (ref 21–32)
GLUCOSE SERPL-MCNC: 90 MG/DL (ref 74–106)
POTASSIUM SERPL-SCNC: 4.4 MMOL/L (ref 3.5–5.1)
SODIUM SERPL-SCNC: 139 MMOL/L (ref 136–145)

## 2019-09-05 PROCEDURE — 36415 COLL VENOUS BLD VENIPUNCTURE: CPT

## 2019-09-05 PROCEDURE — 80048 BASIC METABOLIC PNL TOTAL CA: CPT

## 2019-09-05 PROCEDURE — 83036 HEMOGLOBIN GLYCOSYLATED A1C: CPT

## 2019-09-27 ENCOUNTER — HOSPITAL ENCOUNTER (OUTPATIENT)
Dept: HOSPITAL 15 - XYW | Age: 84
Discharge: HOME | End: 2019-09-27
Attending: INTERNAL MEDICINE
Payer: MEDICARE

## 2019-09-27 DIAGNOSIS — I73.9: ICD-10-CM

## 2019-09-27 DIAGNOSIS — I11.0: ICD-10-CM

## 2019-09-27 DIAGNOSIS — I08.0: Primary | ICD-10-CM

## 2019-09-27 DIAGNOSIS — I50.9: ICD-10-CM

## 2019-09-27 PROCEDURE — 93306 TTE W/DOPPLER COMPLETE: CPT

## 2019-10-22 ENCOUNTER — HOSPITAL ENCOUNTER (OUTPATIENT)
Dept: HOSPITAL 15 - LAB | Age: 84
Discharge: HOME | End: 2019-10-22
Attending: INTERNAL MEDICINE
Payer: MEDICARE

## 2019-10-22 DIAGNOSIS — I10: ICD-10-CM

## 2019-10-22 DIAGNOSIS — E11.9: Primary | ICD-10-CM

## 2019-10-22 LAB
ANION GAP SERPL CALCULATED.3IONS-SCNC: 9 MMOL/L (ref 5–15)
BUN SERPL-MCNC: 13 MG/DL (ref 7–18)
BUN/CREAT SERPL: 9.2
CALCIUM SERPL-MCNC: 9.4 MG/DL (ref 8.5–10.1)
CHLORIDE SERPL-SCNC: 107 MMOL/L (ref 98–107)
CO2 SERPL-SCNC: 24 MMOL/L (ref 21–32)
GLUCOSE SERPL-MCNC: 89 MG/DL (ref 74–106)
POTASSIUM SERPL-SCNC: 3.9 MMOL/L (ref 3.5–5.1)
SODIUM SERPL-SCNC: 140 MMOL/L (ref 136–145)

## 2019-10-22 PROCEDURE — 80048 BASIC METABOLIC PNL TOTAL CA: CPT

## 2019-10-22 PROCEDURE — 36415 COLL VENOUS BLD VENIPUNCTURE: CPT

## 2019-11-14 ENCOUNTER — HOSPITAL ENCOUNTER (OUTPATIENT)
Dept: HOSPITAL 15 - LAB | Age: 84
Discharge: HOME | End: 2019-11-14
Attending: INTERNAL MEDICINE
Payer: MEDICARE

## 2019-11-14 DIAGNOSIS — I11.0: ICD-10-CM

## 2019-11-14 DIAGNOSIS — Z86.73: ICD-10-CM

## 2019-11-14 DIAGNOSIS — Z88.0: ICD-10-CM

## 2019-11-14 DIAGNOSIS — E11.9: Primary | ICD-10-CM

## 2019-11-14 DIAGNOSIS — E78.5: ICD-10-CM

## 2019-11-14 DIAGNOSIS — I50.9: ICD-10-CM

## 2019-11-14 DIAGNOSIS — Z88.2: ICD-10-CM

## 2019-11-14 DIAGNOSIS — I25.10: ICD-10-CM

## 2019-11-14 DIAGNOSIS — I25.2: ICD-10-CM

## 2019-11-14 DIAGNOSIS — Z98.890: ICD-10-CM

## 2019-11-14 DIAGNOSIS — Z87.891: ICD-10-CM

## 2019-11-14 PROCEDURE — 36415 COLL VENOUS BLD VENIPUNCTURE: CPT

## 2019-11-14 PROCEDURE — 83036 HEMOGLOBIN GLYCOSYLATED A1C: CPT

## 2019-11-23 ENCOUNTER — HOSPITAL ENCOUNTER (INPATIENT)
Dept: HOSPITAL 15 - ER | Age: 84
LOS: 3 days | Discharge: HOME | DRG: 291 | End: 2019-11-26
Attending: INTERNAL MEDICINE | Admitting: HOSPITALIST
Payer: MEDICARE

## 2019-11-23 VITALS — WEIGHT: 119.93 LBS | HEIGHT: 64 IN | BODY MASS INDEX: 20.47 KG/M2

## 2019-11-23 VITALS — SYSTOLIC BLOOD PRESSURE: 206 MMHG | DIASTOLIC BLOOD PRESSURE: 114 MMHG

## 2019-11-23 DIAGNOSIS — I11.0: Primary | ICD-10-CM

## 2019-11-23 DIAGNOSIS — J44.1: ICD-10-CM

## 2019-11-23 DIAGNOSIS — N39.0: ICD-10-CM

## 2019-11-23 DIAGNOSIS — I50.43: ICD-10-CM

## 2019-11-23 DIAGNOSIS — Z86.73: ICD-10-CM

## 2019-11-23 DIAGNOSIS — I25.2: ICD-10-CM

## 2019-11-23 DIAGNOSIS — J18.9: ICD-10-CM

## 2019-11-23 DIAGNOSIS — D72.829: ICD-10-CM

## 2019-11-23 DIAGNOSIS — Z87.891: ICD-10-CM

## 2019-11-23 DIAGNOSIS — T46.4X5A: ICD-10-CM

## 2019-11-23 DIAGNOSIS — E78.5: ICD-10-CM

## 2019-11-23 DIAGNOSIS — N17.0: ICD-10-CM

## 2019-11-23 DIAGNOSIS — I34.0: ICD-10-CM

## 2019-11-23 DIAGNOSIS — E87.2: ICD-10-CM

## 2019-11-23 DIAGNOSIS — R73.03: ICD-10-CM

## 2019-11-23 DIAGNOSIS — I25.10: ICD-10-CM

## 2019-11-23 LAB
ALBUMIN SERPL-MCNC: 4 G/DL (ref 3.4–5)
ALP SERPL-CCNC: 129 U/L (ref 45–117)
ALT SERPL-CCNC: 34 U/L (ref 16–61)
ANION GAP SERPL CALCULATED.3IONS-SCNC: 9 MMOL/L (ref 5–15)
APTT PPP: 28.8 SEC (ref 23.64–32.05)
BILIRUB SERPL-MCNC: 0.5 MG/DL (ref 0.2–1)
BUN SERPL-MCNC: 16 MG/DL (ref 7–18)
BUN/CREAT SERPL: 11.8
CALCIUM SERPL-MCNC: 8.4 MG/DL (ref 8.5–10.1)
CHLORIDE SERPL-SCNC: 111 MMOL/L (ref 98–107)
CO2 SERPL-SCNC: 19 MMOL/L (ref 21–32)
GLUCOSE SERPL-MCNC: 166 MG/DL (ref 74–106)
HCT VFR BLD AUTO: 45.3 % (ref 41–53)
HGB BLD-MCNC: 14.6 G/DL (ref 13.5–17.5)
INR PPP: 1.07 (ref 0.9–1.15)
MAGNESIUM SERPL-MCNC: 2.4 MG/DL (ref 1.6–2.6)
MCH RBC QN AUTO: 26.8 PG (ref 28–32)
MCV RBC AUTO: 83.3 FL (ref 80–100)
NRBC BLD QL AUTO: 0 %
POTASSIUM SERPL-SCNC: 4.1 MMOL/L (ref 3.5–5.1)
PROT SERPL-MCNC: 7.8 G/DL (ref 6.4–8.2)
SODIUM SERPL-SCNC: 139 MMOL/L (ref 136–145)

## 2019-11-23 PROCEDURE — 85610 PROTHROMBIN TIME: CPT

## 2019-11-23 PROCEDURE — 80053 COMPREHEN METABOLIC PANEL: CPT

## 2019-11-23 PROCEDURE — 94660 CPAP INITIATION&MGMT: CPT

## 2019-11-23 PROCEDURE — 71045 X-RAY EXAM CHEST 1 VIEW: CPT

## 2019-11-23 PROCEDURE — 96368 THER/DIAG CONCURRENT INF: CPT

## 2019-11-23 PROCEDURE — 80048 BASIC METABOLIC PNL TOTAL CA: CPT

## 2019-11-23 PROCEDURE — 36600 WITHDRAWAL OF ARTERIAL BLOOD: CPT

## 2019-11-23 PROCEDURE — 36415 COLL VENOUS BLD VENIPUNCTURE: CPT

## 2019-11-23 PROCEDURE — 84484 ASSAY OF TROPONIN QUANT: CPT

## 2019-11-23 PROCEDURE — 81001 URINALYSIS AUTO W/SCOPE: CPT

## 2019-11-23 PROCEDURE — 83735 ASSAY OF MAGNESIUM: CPT

## 2019-11-23 PROCEDURE — 5A09357 ASSISTANCE WITH RESPIRATORY VENTILATION, LESS THAN 24 CONSECUTIVE HOURS, CONTINUOUS POSITIVE AIRWAY PRESSURE: ICD-10-PCS | Performed by: INTERNAL MEDICINE

## 2019-11-23 PROCEDURE — 85730 THROMBOPLASTIN TIME PARTIAL: CPT

## 2019-11-23 PROCEDURE — 93005 ELECTROCARDIOGRAM TRACING: CPT

## 2019-11-23 PROCEDURE — 93017 CV STRESS TEST TRACING ONLY: CPT

## 2019-11-23 PROCEDURE — 82962 GLUCOSE BLOOD TEST: CPT

## 2019-11-23 PROCEDURE — 96375 TX/PRO/DX INJ NEW DRUG ADDON: CPT

## 2019-11-23 PROCEDURE — 83880 ASSAY OF NATRIURETIC PEPTIDE: CPT

## 2019-11-23 PROCEDURE — 93306 TTE W/DOPPLER COMPLETE: CPT

## 2019-11-23 PROCEDURE — 85025 COMPLETE CBC W/AUTO DIFF WBC: CPT

## 2019-11-23 PROCEDURE — 78452 HT MUSCLE IMAGE SPECT MULT: CPT

## 2019-11-23 PROCEDURE — 96365 THER/PROPH/DIAG IV INF INIT: CPT

## 2019-11-23 PROCEDURE — 82805 BLOOD GASES W/O2 SATURATION: CPT

## 2019-11-23 PROCEDURE — 71250 CT THORAX DX C-: CPT

## 2019-11-24 VITALS — DIASTOLIC BLOOD PRESSURE: 71 MMHG | SYSTOLIC BLOOD PRESSURE: 129 MMHG

## 2019-11-24 VITALS — SYSTOLIC BLOOD PRESSURE: 115 MMHG | DIASTOLIC BLOOD PRESSURE: 61 MMHG

## 2019-11-24 VITALS — SYSTOLIC BLOOD PRESSURE: 140 MMHG | DIASTOLIC BLOOD PRESSURE: 72 MMHG

## 2019-11-24 VITALS — SYSTOLIC BLOOD PRESSURE: 131 MMHG | DIASTOLIC BLOOD PRESSURE: 64 MMHG

## 2019-11-24 VITALS — DIASTOLIC BLOOD PRESSURE: 61 MMHG | SYSTOLIC BLOOD PRESSURE: 115 MMHG

## 2019-11-24 VITALS — DIASTOLIC BLOOD PRESSURE: 68 MMHG | SYSTOLIC BLOOD PRESSURE: 122 MMHG

## 2019-11-24 LAB
ANION GAP SERPL CALCULATED.3IONS-SCNC: 11 MMOL/L (ref 5–15)
BUN SERPL-MCNC: 21 MG/DL (ref 7–18)
BUN/CREAT SERPL: 12.8
CALCIUM SERPL-MCNC: 9 MG/DL (ref 8.5–10.1)
CHLORIDE SERPL-SCNC: 108 MMOL/L (ref 98–107)
CO2 SERPL-SCNC: 21 MMOL/L (ref 21–32)
GLUCOSE SERPL-MCNC: 149 MG/DL (ref 74–106)
HCT VFR BLD AUTO: 43.8 % (ref 41–53)
HGB BLD-MCNC: 14 G/DL (ref 13.5–17.5)
MCH RBC QN AUTO: 26.6 PG (ref 28–32)
MCV RBC AUTO: 83.4 FL (ref 80–100)
NRBC BLD QL AUTO: 0 %
POTASSIUM SERPL-SCNC: 4.4 MMOL/L (ref 3.5–5.1)
SODIUM SERPL-SCNC: 140 MMOL/L (ref 136–145)

## 2019-11-24 RX ADMIN — AZITHROMYCIN DIHYDRATE SCH MLS/HR: 500 INJECTION, POWDER, LYOPHILIZED, FOR SOLUTION INTRAVENOUS at 03:30

## 2019-11-24 RX ADMIN — SPIRONOLACTONE SCH MG: 25 TABLET, FILM COATED ORAL at 10:41

## 2019-11-24 RX ADMIN — FUROSEMIDE SCH MG: 10 INJECTION, SOLUTION INTRAMUSCULAR; INTRAVENOUS at 22:00

## 2019-11-24 RX ADMIN — FUROSEMIDE SCH MG: 10 INJECTION, SOLUTION INTRAMUSCULAR; INTRAVENOUS at 10:42

## 2019-11-24 RX ADMIN — CEFTRIAXONE SODIUM SCH MLS/HR: 1 INJECTION, POWDER, FOR SOLUTION INTRAMUSCULAR; INTRAVENOUS at 02:50

## 2019-11-24 RX ADMIN — CEFTRIAXONE SODIUM SCH MLS/HR: 1 INJECTION, POWDER, FOR SOLUTION INTRAMUSCULAR; INTRAVENOUS at 10:43

## 2019-11-24 RX ADMIN — SPIRONOLACTONE SCH MG: 25 TABLET, FILM COATED ORAL at 22:01

## 2019-11-24 RX ADMIN — AZITHROMYCIN DIHYDRATE SCH MLS/HR: 500 INJECTION, POWDER, LYOPHILIZED, FOR SOLUTION INTRAVENOUS at 10:43

## 2019-11-24 RX ADMIN — CLOPIDOGREL BISULFATE SCH MG: 75 TABLET ORAL at 10:41

## 2019-11-25 VITALS — SYSTOLIC BLOOD PRESSURE: 130 MMHG | DIASTOLIC BLOOD PRESSURE: 93 MMHG

## 2019-11-25 VITALS — DIASTOLIC BLOOD PRESSURE: 77 MMHG | SYSTOLIC BLOOD PRESSURE: 139 MMHG

## 2019-11-25 VITALS — SYSTOLIC BLOOD PRESSURE: 139 MMHG | DIASTOLIC BLOOD PRESSURE: 85 MMHG

## 2019-11-25 VITALS — SYSTOLIC BLOOD PRESSURE: 132 MMHG | DIASTOLIC BLOOD PRESSURE: 83 MMHG

## 2019-11-25 VITALS — DIASTOLIC BLOOD PRESSURE: 69 MMHG | SYSTOLIC BLOOD PRESSURE: 127 MMHG

## 2019-11-25 VITALS — SYSTOLIC BLOOD PRESSURE: 127 MMHG | DIASTOLIC BLOOD PRESSURE: 69 MMHG

## 2019-11-25 LAB
ALBUMIN SERPL-MCNC: 3.7 G/DL (ref 3.4–5)
ALP SERPL-CCNC: 95 U/L (ref 45–117)
ALT SERPL-CCNC: 26 U/L (ref 16–61)
ANION GAP SERPL CALCULATED.3IONS-SCNC: 7 MMOL/L (ref 5–15)
BILIRUB SERPL-MCNC: 0.4 MG/DL (ref 0.2–1)
BUN SERPL-MCNC: 30 MG/DL (ref 7–18)
BUN/CREAT SERPL: 19
CALCIUM SERPL-MCNC: 8.9 MG/DL (ref 8.5–10.1)
CHLORIDE SERPL-SCNC: 111 MMOL/L (ref 98–107)
CO2 SERPL-SCNC: 25 MMOL/L (ref 21–32)
GLUCOSE SERPL-MCNC: 105 MG/DL (ref 74–106)
HCT VFR BLD AUTO: 40.1 % (ref 41–53)
HGB BLD-MCNC: 13.5 G/DL (ref 13.5–17.5)
MAGNESIUM SERPL-MCNC: 2.2 MG/DL (ref 1.6–2.6)
MCH RBC QN AUTO: 27 PG (ref 28–32)
MCV RBC AUTO: 80.3 FL (ref 80–100)
NRBC BLD QL AUTO: 0.1 %
POTASSIUM SERPL-SCNC: 4 MMOL/L (ref 3.5–5.1)
PROT SERPL-MCNC: 7.1 G/DL (ref 6.4–8.2)
SODIUM SERPL-SCNC: 143 MMOL/L (ref 136–145)

## 2019-11-25 RX ADMIN — NITROGLYCERIN SCH PATCH: 0.2 PATCH TRANSDERMAL at 10:00

## 2019-11-25 RX ADMIN — SPIRONOLACTONE SCH MG: 25 TABLET, FILM COATED ORAL at 21:18

## 2019-11-25 RX ADMIN — AZITHROMYCIN DIHYDRATE SCH MLS/HR: 500 INJECTION, POWDER, LYOPHILIZED, FOR SOLUTION INTRAVENOUS at 10:54

## 2019-11-25 RX ADMIN — CLOPIDOGREL BISULFATE SCH MG: 75 TABLET ORAL at 10:54

## 2019-11-25 RX ADMIN — METHYLPREDNISOLONE SODIUM SUCCINATE SCH MG: 40 INJECTION, POWDER, FOR SOLUTION INTRAMUSCULAR; INTRAVENOUS at 23:25

## 2019-11-25 RX ADMIN — METHYLPREDNISOLONE SODIUM SUCCINATE SCH MG: 40 INJECTION, POWDER, FOR SOLUTION INTRAMUSCULAR; INTRAVENOUS at 17:25

## 2019-11-25 RX ADMIN — SPIRONOLACTONE SCH MG: 25 TABLET, FILM COATED ORAL at 10:54

## 2019-11-25 RX ADMIN — METHYLPREDNISOLONE SODIUM SUCCINATE SCH MG: 40 INJECTION, POWDER, FOR SOLUTION INTRAMUSCULAR; INTRAVENOUS at 12:31

## 2019-11-25 RX ADMIN — CEFTRIAXONE SODIUM SCH MLS/HR: 1 INJECTION, POWDER, FOR SOLUTION INTRAMUSCULAR; INTRAVENOUS at 10:54

## 2019-11-26 VITALS — DIASTOLIC BLOOD PRESSURE: 79 MMHG | SYSTOLIC BLOOD PRESSURE: 132 MMHG

## 2019-11-26 VITALS — DIASTOLIC BLOOD PRESSURE: 83 MMHG | SYSTOLIC BLOOD PRESSURE: 132 MMHG

## 2019-11-26 VITALS — SYSTOLIC BLOOD PRESSURE: 155 MMHG | DIASTOLIC BLOOD PRESSURE: 82 MMHG

## 2019-11-26 LAB
ANION GAP SERPL CALCULATED.3IONS-SCNC: 10 MMOL/L (ref 5–15)
BUN SERPL-MCNC: 33 MG/DL (ref 7–18)
BUN/CREAT SERPL: 25.2
CALCIUM SERPL-MCNC: 8.4 MG/DL (ref 8.5–10.1)
CHLORIDE SERPL-SCNC: 112 MMOL/L (ref 98–107)
CO2 SERPL-SCNC: 23 MMOL/L (ref 21–32)
GLUCOSE SERPL-MCNC: 126 MG/DL (ref 74–106)
HCT VFR BLD AUTO: 41.7 % (ref 41–53)
HGB BLD-MCNC: 13.4 G/DL (ref 13.5–17.5)
MAGNESIUM SERPL-MCNC: 2.2 MG/DL (ref 1.6–2.6)
MCH RBC QN AUTO: 27.1 PG (ref 28–32)
MCV RBC AUTO: 84.4 FL (ref 80–100)
NRBC BLD QL AUTO: 0 %
POTASSIUM SERPL-SCNC: 3.9 MMOL/L (ref 3.5–5.1)
SODIUM SERPL-SCNC: 145 MMOL/L (ref 136–145)

## 2019-11-26 RX ADMIN — CEFTRIAXONE SODIUM SCH MLS/HR: 1 INJECTION, POWDER, FOR SOLUTION INTRAMUSCULAR; INTRAVENOUS at 09:33

## 2019-11-26 RX ADMIN — CLOPIDOGREL BISULFATE SCH MG: 75 TABLET ORAL at 09:33

## 2019-11-26 RX ADMIN — SPIRONOLACTONE SCH MG: 25 TABLET, FILM COATED ORAL at 09:33

## 2019-11-26 RX ADMIN — NITROGLYCERIN SCH PATCH: 0.2 PATCH TRANSDERMAL at 09:56

## 2019-11-26 RX ADMIN — METHYLPREDNISOLONE SODIUM SUCCINATE SCH MG: 40 INJECTION, POWDER, FOR SOLUTION INTRAMUSCULAR; INTRAVENOUS at 06:00

## 2019-12-10 ENCOUNTER — HOSPITAL ENCOUNTER (OUTPATIENT)
Dept: HOSPITAL 15 - LAB | Age: 84
Discharge: HOME | End: 2019-12-10
Attending: INTERNAL MEDICINE
Payer: MEDICARE

## 2019-12-10 DIAGNOSIS — I50.9: ICD-10-CM

## 2019-12-10 DIAGNOSIS — E11.9: ICD-10-CM

## 2019-12-10 DIAGNOSIS — J44.9: Primary | ICD-10-CM

## 2019-12-10 LAB
ANION GAP SERPL CALCULATED.3IONS-SCNC: 9 MMOL/L (ref 5–15)
BUN SERPL-MCNC: 23 MG/DL (ref 7–18)
BUN/CREAT SERPL: 15.9
CALCIUM SERPL-MCNC: 8.5 MG/DL (ref 8.5–10.1)
CHLORIDE SERPL-SCNC: 112 MMOL/L (ref 98–107)
CO2 SERPL-SCNC: 20 MMOL/L (ref 21–32)
GLUCOSE SERPL-MCNC: 83 MG/DL (ref 74–106)
POTASSIUM SERPL-SCNC: 4.4 MMOL/L (ref 3.5–5.1)
SODIUM SERPL-SCNC: 141 MMOL/L (ref 136–145)

## 2019-12-10 PROCEDURE — 83880 ASSAY OF NATRIURETIC PEPTIDE: CPT

## 2019-12-10 PROCEDURE — 36415 COLL VENOUS BLD VENIPUNCTURE: CPT

## 2019-12-10 PROCEDURE — 80048 BASIC METABOLIC PNL TOTAL CA: CPT

## 2019-12-18 ENCOUNTER — HOSPITAL ENCOUNTER (OUTPATIENT)
Dept: HOSPITAL 15 - LAB | Age: 84
Discharge: HOME | End: 2019-12-18
Attending: INTERNAL MEDICINE
Payer: MEDICARE

## 2019-12-18 DIAGNOSIS — M79.10: Primary | ICD-10-CM

## 2019-12-18 DIAGNOSIS — E78.5: ICD-10-CM

## 2019-12-18 LAB
ALBUMIN SERPL-MCNC: 3.7 G/DL (ref 3.4–5)
ALP SERPL-CCNC: 105 U/L (ref 45–117)
ALT SERPL-CCNC: 18 U/L (ref 16–61)
BILIRUB DIRECT SERPL-MCNC: 0.1 MG/DL (ref 0–0.2)
BILIRUB SERPL-MCNC: 0.4 MG/DL (ref 0.2–1)
CK SERPL-CCNC: 47 U/L (ref 39–308)
PROT SERPL-MCNC: 7.5 G/DL (ref 6.4–8.2)

## 2019-12-18 PROCEDURE — 36415 COLL VENOUS BLD VENIPUNCTURE: CPT

## 2019-12-18 PROCEDURE — 82550 ASSAY OF CK (CPK): CPT

## 2019-12-18 PROCEDURE — 80076 HEPATIC FUNCTION PANEL: CPT

## 2020-01-07 ENCOUNTER — HOSPITAL ENCOUNTER (OUTPATIENT)
Dept: HOSPITAL 15 - LAB | Age: 85
Discharge: HOME | End: 2020-01-07
Attending: INTERNAL MEDICINE
Payer: MEDICARE

## 2020-01-07 DIAGNOSIS — M79.10: Primary | ICD-10-CM

## 2020-01-07 PROCEDURE — 36415 COLL VENOUS BLD VENIPUNCTURE: CPT

## 2020-01-07 PROCEDURE — 85652 RBC SED RATE AUTOMATED: CPT

## 2020-01-07 PROCEDURE — 82550 ASSAY OF CK (CPK): CPT

## 2020-01-17 ENCOUNTER — HOSPITAL ENCOUNTER (OUTPATIENT)
Dept: HOSPITAL 15 - XYW | Age: 85
Discharge: HOME | End: 2020-01-17
Attending: INTERNAL MEDICINE
Payer: MEDICARE

## 2020-01-17 DIAGNOSIS — I10: ICD-10-CM

## 2020-01-17 DIAGNOSIS — I08.3: Primary | ICD-10-CM

## 2020-01-17 PROCEDURE — 93306 TTE W/DOPPLER COMPLETE: CPT

## 2020-01-31 ENCOUNTER — HOSPITAL ENCOUNTER (INPATIENT)
Dept: HOSPITAL 15 - ER | Age: 85
LOS: 2 days | Discharge: HOME | DRG: 291 | End: 2020-02-02
Attending: INTERNAL MEDICINE | Admitting: INTERNAL MEDICINE
Payer: MEDICARE

## 2020-01-31 VITALS — DIASTOLIC BLOOD PRESSURE: 76 MMHG | SYSTOLIC BLOOD PRESSURE: 154 MMHG

## 2020-01-31 VITALS — WEIGHT: 106.04 LBS | BODY MASS INDEX: 20.82 KG/M2 | HEIGHT: 60 IN

## 2020-01-31 DIAGNOSIS — I13.0: Primary | ICD-10-CM

## 2020-01-31 DIAGNOSIS — I73.9: ICD-10-CM

## 2020-01-31 DIAGNOSIS — J20.9: ICD-10-CM

## 2020-01-31 DIAGNOSIS — I70.0: ICD-10-CM

## 2020-01-31 DIAGNOSIS — I50.23: ICD-10-CM

## 2020-01-31 DIAGNOSIS — I25.10: ICD-10-CM

## 2020-01-31 DIAGNOSIS — E78.00: ICD-10-CM

## 2020-01-31 DIAGNOSIS — I25.2: ICD-10-CM

## 2020-01-31 DIAGNOSIS — Z86.73: ICD-10-CM

## 2020-01-31 DIAGNOSIS — Z79.899: ICD-10-CM

## 2020-01-31 DIAGNOSIS — Z88.2: ICD-10-CM

## 2020-01-31 DIAGNOSIS — J10.1: ICD-10-CM

## 2020-01-31 DIAGNOSIS — Z79.82: ICD-10-CM

## 2020-01-31 DIAGNOSIS — J44.1: ICD-10-CM

## 2020-01-31 DIAGNOSIS — Z95.5: ICD-10-CM

## 2020-01-31 DIAGNOSIS — J44.0: ICD-10-CM

## 2020-01-31 DIAGNOSIS — E78.5: ICD-10-CM

## 2020-01-31 DIAGNOSIS — N18.2: ICD-10-CM

## 2020-01-31 DIAGNOSIS — Z88.0: ICD-10-CM

## 2020-01-31 LAB
ALBUMIN SERPL-MCNC: 3.7 G/DL (ref 3.4–5)
ALP SERPL-CCNC: 101 U/L (ref 45–117)
ALT SERPL-CCNC: 16 U/L (ref 16–61)
ANION GAP SERPL CALCULATED.3IONS-SCNC: 8 MMOL/L (ref 5–15)
BILIRUB SERPL-MCNC: 0.4 MG/DL (ref 0.2–1)
BUN SERPL-MCNC: 20 MG/DL (ref 7–18)
BUN/CREAT SERPL: 15.3
CALCIUM SERPL-MCNC: 9.1 MG/DL (ref 8.5–10.1)
CHLORIDE SERPL-SCNC: 110 MMOL/L (ref 98–107)
CO2 SERPL-SCNC: 21 MMOL/L (ref 21–32)
GLUCOSE SERPL-MCNC: 93 MG/DL (ref 74–106)
HCT VFR BLD AUTO: 42.9 % (ref 41–53)
HGB BLD-MCNC: 14.1 G/DL (ref 13.5–17.5)
MCH RBC QN AUTO: 27.8 PG (ref 28–32)
MCV RBC AUTO: 84.3 FL (ref 80–100)
NRBC BLD QL AUTO: 0 %
POTASSIUM SERPL-SCNC: 4.3 MMOL/L (ref 3.5–5.1)
PROT SERPL-MCNC: 7.2 G/DL (ref 6.4–8.2)
SODIUM SERPL-SCNC: 139 MMOL/L (ref 136–145)

## 2020-01-31 PROCEDURE — 85652 RBC SED RATE AUTOMATED: CPT

## 2020-01-31 PROCEDURE — 87804 INFLUENZA ASSAY W/OPTIC: CPT

## 2020-01-31 PROCEDURE — 85025 COMPLETE CBC W/AUTO DIFF WBC: CPT

## 2020-01-31 PROCEDURE — 83880 ASSAY OF NATRIURETIC PEPTIDE: CPT

## 2020-01-31 PROCEDURE — 36415 COLL VENOUS BLD VENIPUNCTURE: CPT

## 2020-01-31 PROCEDURE — 87077 CULTURE AEROBIC IDENTIFY: CPT

## 2020-01-31 PROCEDURE — 94640 AIRWAY INHALATION TREATMENT: CPT

## 2020-01-31 PROCEDURE — 82550 ASSAY OF CK (CPK): CPT

## 2020-01-31 PROCEDURE — 86141 C-REACTIVE PROTEIN HS: CPT

## 2020-01-31 PROCEDURE — 80053 COMPREHEN METABOLIC PANEL: CPT

## 2020-01-31 PROCEDURE — 87070 CULTURE OTHR SPECIMN AEROBIC: CPT

## 2020-01-31 PROCEDURE — 87205 SMEAR GRAM STAIN: CPT

## 2020-01-31 PROCEDURE — 93005 ELECTROCARDIOGRAM TRACING: CPT

## 2020-01-31 PROCEDURE — 99291 CRITICAL CARE FIRST HOUR: CPT

## 2020-01-31 PROCEDURE — 71046 X-RAY EXAM CHEST 2 VIEWS: CPT

## 2020-01-31 PROCEDURE — 87186 SC STD MICRODIL/AGAR DIL: CPT

## 2020-01-31 PROCEDURE — 85379 FIBRIN DEGRADATION QUANT: CPT

## 2020-01-31 PROCEDURE — 84484 ASSAY OF TROPONIN QUANT: CPT

## 2020-01-31 RX ADMIN — IPRATROPIUM BROMIDE SCH MG: 0.5 SOLUTION RESPIRATORY (INHALATION) at 23:46

## 2020-01-31 RX ADMIN — SPIRONOLACTONE SCH MG: 25 TABLET, FILM COATED ORAL at 22:39

## 2020-01-31 RX ADMIN — ENALAPRIL MALEATE SCH MG: 2.5 TABLET ORAL at 22:28

## 2020-01-31 RX ADMIN — IPRATROPIUM BROMIDE SCH MG: 0.5 SOLUTION RESPIRATORY (INHALATION) at 18:00

## 2020-01-31 RX ADMIN — OSELTAMIVIR PHOSPHATE SCH MG: 30 CAPSULE ORAL at 22:32

## 2020-01-31 RX ADMIN — ALBUTEROL SULFATE SCH MG: 2.5 SOLUTION RESPIRATORY (INHALATION) at 23:46

## 2020-01-31 RX ADMIN — CARVEDILOL SCH MG: 3.12 TABLET, FILM COATED ORAL at 22:30

## 2020-01-31 RX ADMIN — SODIUM CHLORIDE SCH ML: 9 INJECTION INTRAMUSCULAR; INTRAVENOUS; SUBCUTANEOUS at 22:41

## 2020-01-31 RX ADMIN — DOXYCYCLINE SCH MLS/HR: 100 INJECTION, POWDER, LYOPHILIZED, FOR SOLUTION INTRAVENOUS at 16:04

## 2020-01-31 RX ADMIN — ATORVASTATIN CALCIUM SCH MG: 20 TABLET, FILM COATED ORAL at 22:40

## 2020-01-31 RX ADMIN — ALBUTEROL SULFATE SCH MG: 2.5 SOLUTION RESPIRATORY (INHALATION) at 18:00

## 2020-01-31 NOTE — NUR
OPENING SHIFT NOTE

Assumed care of patient, awake and alert x4. Patient denies pain or shortness of breath at 
this time. Instructed on plan of care and to call for assistance as needed, patient 
verbalized understanding. Bed is locked in lowest position, side rails x2 are up, call light 
is within reach, and bed alarm is on.

## 2020-01-31 NOTE — NUR
RECEIVED CALL FROM HOSPITALIST RE: CRITICAL LAB RESULTS

Notified GIANNI Coffman of patient's positive Influenza B results. NP Coffman aware that patient 
is receiving Tamiflu 30mg PO BID. No new orders received at this time.

## 2020-01-31 NOTE — NUR
RECEIVED CALL FROM LAB RE: CRITICAL LAB RESULT 

This RN notified by  Emerald Bose, of positive lab results for Influenza B. 
Will notify hospitalist per protocol.

## 2020-01-31 NOTE — NUR
HOSPITALIST PAGED RE: CRITICAL LAB RESULT

Hospitalist paged regarding critical lab result: Influenza Type B: positive. Patient is 
receiving Tamiflu 30mg PO BID. Will notify hospitalist per protocol. Awaiting call back.

## 2020-01-31 NOTE — NUR
Telemetry admit from ER

KRIS CARCAMO admitted to Telemetry unit after SBAR received.  Patient oriented to FREDDIE LANGFORD, primary RN, unit, room, bed, and unit policies regarding patient care and 
visiting hours. Patient now on continuous telemetry monitoring, tele box # 14 and telemetry 
reading on arrival to unit is . Patient placed on bedside oxygen, weighed by bedscale and 
encouraged to call if they need something. All questions and concerns addressed, patient 
verbalized understanding. 

Note:

## 2020-02-01 VITALS — DIASTOLIC BLOOD PRESSURE: 71 MMHG | SYSTOLIC BLOOD PRESSURE: 140 MMHG

## 2020-02-01 VITALS — SYSTOLIC BLOOD PRESSURE: 103 MMHG | DIASTOLIC BLOOD PRESSURE: 60 MMHG

## 2020-02-01 VITALS — DIASTOLIC BLOOD PRESSURE: 78 MMHG | SYSTOLIC BLOOD PRESSURE: 146 MMHG

## 2020-02-01 VITALS — SYSTOLIC BLOOD PRESSURE: 125 MMHG | DIASTOLIC BLOOD PRESSURE: 66 MMHG

## 2020-02-01 VITALS — SYSTOLIC BLOOD PRESSURE: 149 MMHG | DIASTOLIC BLOOD PRESSURE: 92 MMHG

## 2020-02-01 VITALS — DIASTOLIC BLOOD PRESSURE: 72 MMHG | SYSTOLIC BLOOD PRESSURE: 124 MMHG

## 2020-02-01 LAB
ALBUMIN SERPL-MCNC: 3.3 G/DL (ref 3.4–5)
ALP SERPL-CCNC: 95 U/L (ref 45–117)
ALT SERPL-CCNC: 16 U/L (ref 16–61)
ANION GAP SERPL CALCULATED.3IONS-SCNC: 9 MMOL/L (ref 5–15)
BILIRUB SERPL-MCNC: 0.5 MG/DL (ref 0.2–1)
BUN SERPL-MCNC: 19 MG/DL (ref 7–18)
BUN/CREAT SERPL: 16.2
CALCIUM SERPL-MCNC: 8.4 MG/DL (ref 8.5–10.1)
CHLORIDE SERPL-SCNC: 110 MMOL/L (ref 98–107)
CO2 SERPL-SCNC: 21 MMOL/L (ref 21–32)
GLUCOSE SERPL-MCNC: 102 MG/DL (ref 74–106)
POTASSIUM SERPL-SCNC: 4.4 MMOL/L (ref 3.5–5.1)
PROT SERPL-MCNC: 6.8 G/DL (ref 6.4–8.2)
SODIUM SERPL-SCNC: 140 MMOL/L (ref 136–145)

## 2020-02-01 RX ADMIN — ENALAPRIL MALEATE SCH MG: 2.5 TABLET ORAL at 22:00

## 2020-02-01 RX ADMIN — ENALAPRIL MALEATE SCH MG: 2.5 TABLET ORAL at 09:12

## 2020-02-01 RX ADMIN — ENOXAPARIN SODIUM SCH MG: 30 INJECTION SUBCUTANEOUS at 09:11

## 2020-02-01 RX ADMIN — SPIRONOLACTONE SCH MG: 25 TABLET, FILM COATED ORAL at 22:51

## 2020-02-01 RX ADMIN — ATORVASTATIN CALCIUM SCH MG: 20 TABLET, FILM COATED ORAL at 22:52

## 2020-02-01 RX ADMIN — ALBUTEROL SULFATE SCH MG: 2.5 SOLUTION RESPIRATORY (INHALATION) at 05:54

## 2020-02-01 RX ADMIN — ALBUTEROL SULFATE SCH MG: 2.5 SOLUTION RESPIRATORY (INHALATION) at 20:09

## 2020-02-01 RX ADMIN — NITROGLYCERIN SCH PATCH: 0.2 PATCH TRANSDERMAL at 09:11

## 2020-02-01 RX ADMIN — DOXYCYCLINE SCH MLS/HR: 100 INJECTION, POWDER, LYOPHILIZED, FOR SOLUTION INTRAVENOUS at 04:11

## 2020-02-01 RX ADMIN — IPRATROPIUM BROMIDE SCH MG: 0.5 SOLUTION RESPIRATORY (INHALATION) at 19:36

## 2020-02-01 RX ADMIN — DOXYCYCLINE SCH MLS/HR: 100 INJECTION, POWDER, LYOPHILIZED, FOR SOLUTION INTRAVENOUS at 15:20

## 2020-02-01 RX ADMIN — CARVEDILOL SCH MG: 3.12 TABLET, FILM COATED ORAL at 22:00

## 2020-02-01 RX ADMIN — IPRATROPIUM BROMIDE SCH MG: 0.5 SOLUTION RESPIRATORY (INHALATION) at 20:09

## 2020-02-01 RX ADMIN — OSELTAMIVIR PHOSPHATE SCH MG: 30 CAPSULE ORAL at 22:52

## 2020-02-01 RX ADMIN — IPRATROPIUM BROMIDE SCH MG: 0.5 SOLUTION RESPIRATORY (INHALATION) at 12:09

## 2020-02-01 RX ADMIN — ALBUTEROL SULFATE SCH MG: 2.5 SOLUTION RESPIRATORY (INHALATION) at 19:36

## 2020-02-01 RX ADMIN — IPRATROPIUM BROMIDE SCH MG: 0.5 SOLUTION RESPIRATORY (INHALATION) at 05:54

## 2020-02-01 RX ADMIN — SODIUM CHLORIDE SCH ML: 9 INJECTION INTRAMUSCULAR; INTRAVENOUS; SUBCUTANEOUS at 14:00

## 2020-02-01 RX ADMIN — FUROSEMIDE SCH MG: 10 INJECTION, SOLUTION INTRAMUSCULAR; INTRAVENOUS at 22:00

## 2020-02-01 RX ADMIN — ALBUTEROL SULFATE SCH MG: 2.5 SOLUTION RESPIRATORY (INHALATION) at 12:09

## 2020-02-01 RX ADMIN — CLOPIDOGREL BISULFATE SCH MG: 75 TABLET ORAL at 09:12

## 2020-02-01 RX ADMIN — SPIRONOLACTONE SCH MG: 25 TABLET, FILM COATED ORAL at 09:12

## 2020-02-01 RX ADMIN — OSELTAMIVIR PHOSPHATE SCH MG: 30 CAPSULE ORAL at 09:09

## 2020-02-01 RX ADMIN — CARVEDILOL SCH MG: 3.12 TABLET, FILM COATED ORAL at 09:11

## 2020-02-01 RX ADMIN — SODIUM CHLORIDE SCH ML: 9 INJECTION INTRAMUSCULAR; INTRAVENOUS; SUBCUTANEOUS at 06:22

## 2020-02-01 NOTE — NUR
RT NOTE

PT WAS SEEN BY RT FOR HHN TX SEVERAL TIMES. PT FIRST IS EATING, THEN IN RESTROOM AND THEN 
GETTING ASSESSED BY RN. NO SOB OR DISTRESS NOTED.  MEDICATION HELD AT THIS TIME. RN WILL 
CALL IF TX NEEDED BEFORE NEXT SCHEDULED TIME. CONT AS ORDERED

-------------------------------------------------------------------------------

Addendum: 02/01/20 at 2156 by Dinora Hardin RT

-------------------------------------------------------------------------------

Amended: Links added.

## 2020-02-01 NOTE — NUR
CHEST PAIN 

Patient called complaining of non-radiating chest pain. Patient described chest pain as a 
pressure like feeling and complained of shortness of breath. Patient denied nausea at the 
time. EKG was performed, results identified "Sinus rhythm, left atrial enlargement, right 
bundle block branch, inferior infarct, old, abnorm T, probable ischemia, anterolateral lds," 
EKG was reviewed and signed off by GIANNI Coffman, EKG placed in hardchart. Vital signs upon 
assessment were the following: BP: 148/86, HR: 76, RR: 18, SPO2: 100% on 1L NC, and TEMP: 
98.3. Patient was medicated with nitroglycerin 0.4mg sublingual x1 (see eMAR), patient 
reported chest pain relief after 5 minutes (pain 0/10). Vital signs post nitroglycerin 
administration were the following: BP: 141/74, HR: 77, RR: 18, and SPO2: 99% on 1L NC. 
Patient is currently laying in bed with even and unlabored respirations, no signs/symptoms 
of distress or shortness of breath noted or reported by patient. Bed is locked in lowest 
position, side rails x 2 are up, call light is within reach, and bed alarm is on. Patient 
instructed to call this RN for assistance as needed or if chest pain or shortness of breath 
returns, patient verbalized understanding.

## 2020-02-01 NOTE — NUR
pt confused got out of bed and was urinating in bedside water pitcher and the floor, gave pt 
his urinal to urinate and then placed back into into bed call light within reach bed alarm 
on

## 2020-02-02 VITALS — SYSTOLIC BLOOD PRESSURE: 141 MMHG | DIASTOLIC BLOOD PRESSURE: 78 MMHG

## 2020-02-02 VITALS — DIASTOLIC BLOOD PRESSURE: 75 MMHG | SYSTOLIC BLOOD PRESSURE: 126 MMHG

## 2020-02-02 RX ADMIN — IPRATROPIUM BROMIDE SCH MG: 0.5 SOLUTION RESPIRATORY (INHALATION) at 06:53

## 2020-02-02 RX ADMIN — CLOPIDOGREL BISULFATE SCH MG: 75 TABLET ORAL at 09:32

## 2020-02-02 RX ADMIN — OSELTAMIVIR PHOSPHATE SCH MG: 30 CAPSULE ORAL at 09:31

## 2020-02-02 RX ADMIN — SODIUM CHLORIDE SCH ML: 9 INJECTION INTRAMUSCULAR; INTRAVENOUS; SUBCUTANEOUS at 00:42

## 2020-02-02 RX ADMIN — NITROGLYCERIN SCH PATCH: 0.2 PATCH TRANSDERMAL at 09:33

## 2020-02-02 RX ADMIN — CARVEDILOL SCH MG: 3.12 TABLET, FILM COATED ORAL at 09:32

## 2020-02-02 RX ADMIN — SODIUM CHLORIDE SCH ML: 9 INJECTION INTRAMUSCULAR; INTRAVENOUS; SUBCUTANEOUS at 05:01

## 2020-02-02 RX ADMIN — ALBUTEROL SULFATE SCH MG: 2.5 SOLUTION RESPIRATORY (INHALATION) at 06:53

## 2020-02-02 RX ADMIN — FUROSEMIDE SCH MG: 10 INJECTION, SOLUTION INTRAMUSCULAR; INTRAVENOUS at 09:31

## 2020-02-02 RX ADMIN — DOXYCYCLINE SCH MLS/HR: 100 INJECTION, POWDER, LYOPHILIZED, FOR SOLUTION INTRAVENOUS at 05:01

## 2020-02-02 RX ADMIN — ALBUTEROL SULFATE SCH MG: 2.5 SOLUTION RESPIRATORY (INHALATION) at 00:11

## 2020-02-02 RX ADMIN — ENALAPRIL MALEATE SCH MG: 2.5 TABLET ORAL at 09:33

## 2020-02-02 RX ADMIN — ENOXAPARIN SODIUM SCH MG: 30 INJECTION SUBCUTANEOUS at 09:33

## 2020-02-02 RX ADMIN — IPRATROPIUM BROMIDE SCH MG: 0.5 SOLUTION RESPIRATORY (INHALATION) at 00:11

## 2020-02-02 RX ADMIN — SPIRONOLACTONE SCH MG: 25 TABLET, FILM COATED ORAL at 09:33

## 2020-02-02 NOTE — NUR
CALLED [facility] THEY STATED PT LIVES IN THE [facility] AND HIS FAMILY 
WILL TAKE HIM BACK THERE NO CASE MANAGEMNT NEEDED

## 2020-02-02 NOTE — NUR
On Call-I received a page from nurse Mascorro letting me know that this patient has order to 
discharge back to [facility].  I called [facility] [phone] and spoke with Yu, she let me 
know that patient lives in their independent section and  they do not need to be notified 
when patient returns.  I relayed this information to nurse Mascorro and let her know that 
family should be the ones to transport patient back to [facility].

## 2020-02-02 NOTE — NUR
RT NOTE

PT WAS SEEN BY RT FOR HHN TX. PT TOLERATES WELL VIA MASK. NO ADVERSE REACTION NOTED. CONT AS 
ORDERED

-------------------------------------------------------------------------------

Addendum: 02/02/20 at 0059 by Dinora Hardin RT

-------------------------------------------------------------------------------

Amended: Links added.

## 2020-02-02 NOTE — NUR
PT DISCHARGED HOME WITH [facility] PT WHEELCHAIR DOWN TO LOBBY PT AMBULATORY TO CAR, PT TELE 
MONITOR REMOVED AND SENT BACK TO TELE, IV REMOVED NO RESPIRATORY DISTRESS NOTED

## 2020-07-15 ENCOUNTER — HOSPITAL ENCOUNTER (OUTPATIENT)
Dept: HOSPITAL 15 - LAB | Age: 85
Discharge: HOME | End: 2020-07-15
Attending: INTERNAL MEDICINE
Payer: COMMERCIAL

## 2020-07-15 DIAGNOSIS — R73.03: ICD-10-CM

## 2020-07-15 DIAGNOSIS — J44.9: Primary | ICD-10-CM

## 2020-07-15 LAB
ALBUMIN SERPL-MCNC: 3.6 G/DL (ref 3.4–5)
ALP SERPL-CCNC: 84 U/L (ref 45–117)
ALT SERPL-CCNC: 11 U/L (ref 16–61)
ANION GAP SERPL CALCULATED.3IONS-SCNC: 8 MMOL/L (ref 5–15)
BILIRUB SERPL-MCNC: 0.6 MG/DL (ref 0.2–1)
BUN SERPL-MCNC: 21 MG/DL (ref 7–18)
BUN/CREAT SERPL: 15.4
CALCIUM SERPL-MCNC: 9 MG/DL (ref 8.5–10.1)
CHLORIDE SERPL-SCNC: 108 MMOL/L (ref 98–107)
CO2 SERPL-SCNC: 23 MMOL/L (ref 21–32)
GLUCOSE SERPL-MCNC: 85 MG/DL (ref 74–106)
HCT VFR BLD AUTO: 45.1 % (ref 41–53)
HGB BLD-MCNC: 14.7 G/DL (ref 13.5–17.5)
MCH RBC QN AUTO: 29 PG (ref 28–32)
MCV RBC AUTO: 89.2 FL (ref 80–100)
NRBC BLD QL AUTO: 0 %
POTASSIUM SERPL-SCNC: 3.7 MMOL/L (ref 3.5–5.1)
PROT SERPL-MCNC: 7.4 G/DL (ref 6.4–8.2)
SODIUM SERPL-SCNC: 139 MMOL/L (ref 136–145)

## 2020-07-15 PROCEDURE — 80053 COMPREHEN METABOLIC PANEL: CPT

## 2020-07-15 PROCEDURE — 83036 HEMOGLOBIN GLYCOSYLATED A1C: CPT

## 2020-07-15 PROCEDURE — 85025 COMPLETE CBC W/AUTO DIFF WBC: CPT

## 2020-07-15 PROCEDURE — 36415 COLL VENOUS BLD VENIPUNCTURE: CPT

## 2021-01-04 ENCOUNTER — HOSPITAL ENCOUNTER (INPATIENT)
Dept: HOSPITAL 15 - ER | Age: 86
LOS: 4 days | Discharge: HOME | DRG: 291 | End: 2021-01-08
Attending: FAMILY MEDICINE | Admitting: NURSE PRACTITIONER
Payer: COMMERCIAL

## 2021-01-04 VITALS — BODY MASS INDEX: 20.99 KG/M2 | WEIGHT: 106.92 LBS | HEIGHT: 60 IN

## 2021-01-04 DIAGNOSIS — Z83.3: ICD-10-CM

## 2021-01-04 DIAGNOSIS — Z79.82: ICD-10-CM

## 2021-01-04 DIAGNOSIS — E78.5: ICD-10-CM

## 2021-01-04 DIAGNOSIS — L03.116: ICD-10-CM

## 2021-01-04 DIAGNOSIS — I13.0: Primary | ICD-10-CM

## 2021-01-04 DIAGNOSIS — I42.0: ICD-10-CM

## 2021-01-04 DIAGNOSIS — I50.23: ICD-10-CM

## 2021-01-04 DIAGNOSIS — I25.10: ICD-10-CM

## 2021-01-04 DIAGNOSIS — Z86.73: ICD-10-CM

## 2021-01-04 DIAGNOSIS — N18.30: ICD-10-CM

## 2021-01-04 DIAGNOSIS — Z95.5: ICD-10-CM

## 2021-01-04 DIAGNOSIS — Z20.822: ICD-10-CM

## 2021-01-04 DIAGNOSIS — L03.115: ICD-10-CM

## 2021-01-04 DIAGNOSIS — Z86.718: ICD-10-CM

## 2021-01-04 DIAGNOSIS — J44.1: ICD-10-CM

## 2021-01-04 LAB
ALBUMIN SERPL-MCNC: 3.9 G/DL (ref 3.4–5)
ALP SERPL-CCNC: 108 U/L (ref 45–117)
ALT SERPL-CCNC: 19 U/L (ref 16–61)
ANION GAP SERPL CALCULATED.3IONS-SCNC: 8 MMOL/L (ref 5–15)
BILIRUB SERPL-MCNC: 0.6 MG/DL (ref 0.2–1)
BUN SERPL-MCNC: 22 MG/DL (ref 7–18)
BUN/CREAT SERPL: 13.9
CALCIUM SERPL-MCNC: 8.9 MG/DL (ref 8.5–10.1)
CHLORIDE SERPL-SCNC: 106 MMOL/L (ref 98–107)
CO2 SERPL-SCNC: 26 MMOL/L (ref 21–32)
GLUCOSE SERPL-MCNC: 91 MG/DL (ref 74–106)
HCT VFR BLD AUTO: 46.7 % (ref 41–53)
HGB BLD-MCNC: 15.6 G/DL (ref 13.5–17.5)
MAGNESIUM SERPL-MCNC: 2.2 MG/DL (ref 1.6–2.6)
MCH RBC QN AUTO: 29 PG (ref 28–32)
MCV RBC AUTO: 86.7 FL (ref 80–100)
NRBC BLD QL AUTO: 0.1 %
POTASSIUM SERPL-SCNC: 4.6 MMOL/L (ref 3.5–5.1)
PROT SERPL-MCNC: 7.9 G/DL (ref 6.4–8.2)
SODIUM SERPL-SCNC: 140 MMOL/L (ref 136–145)

## 2021-01-04 PROCEDURE — 71045 X-RAY EXAM CHEST 1 VIEW: CPT

## 2021-01-04 PROCEDURE — 82728 ASSAY OF FERRITIN: CPT

## 2021-01-04 PROCEDURE — 80053 COMPREHEN METABOLIC PANEL: CPT

## 2021-01-04 PROCEDURE — 83880 ASSAY OF NATRIURETIC PEPTIDE: CPT

## 2021-01-04 PROCEDURE — 84484 ASSAY OF TROPONIN QUANT: CPT

## 2021-01-04 PROCEDURE — 85025 COMPLETE CBC W/AUTO DIFF WBC: CPT

## 2021-01-04 PROCEDURE — 93306 TTE W/DOPPLER COMPLETE: CPT

## 2021-01-04 PROCEDURE — 94640 AIRWAY INHALATION TREATMENT: CPT

## 2021-01-04 PROCEDURE — 93005 ELECTROCARDIOGRAM TRACING: CPT

## 2021-01-04 PROCEDURE — 86141 C-REACTIVE PROTEIN HS: CPT

## 2021-01-04 PROCEDURE — 78582 LUNG VENTILAT&PERFUS IMAGING: CPT

## 2021-01-04 PROCEDURE — 87426 SARSCOV CORONAVIRUS AG IA: CPT

## 2021-01-04 PROCEDURE — 93970 EXTREMITY STUDY: CPT

## 2021-01-04 PROCEDURE — 83735 ASSAY OF MAGNESIUM: CPT

## 2021-01-04 PROCEDURE — 36415 COLL VENOUS BLD VENIPUNCTURE: CPT

## 2021-01-04 PROCEDURE — 85379 FIBRIN DEGRADATION QUANT: CPT

## 2021-01-04 RX ADMIN — SPIRONOLACTONE SCH MG: 25 TABLET, FILM COATED ORAL at 23:28

## 2021-01-04 RX ADMIN — HYDROCORTISONE SCH APPLIC: 25 CREAM TOPICAL at 23:24

## 2021-01-04 RX ADMIN — ATORVASTATIN CALCIUM SCH MG: 20 TABLET, FILM COATED ORAL at 23:28

## 2021-01-05 RX ADMIN — CLINDAMYCIN PHOSPHATE SCH MLS/HR: 6 INJECTION, SOLUTION INTRAVENOUS at 06:12

## 2021-01-05 RX ADMIN — CARVEDILOL SCH MG: 3.12 TABLET, FILM COATED ORAL at 22:00

## 2021-01-05 RX ADMIN — SPIRONOLACTONE SCH MG: 25 TABLET, FILM COATED ORAL at 11:34

## 2021-01-05 RX ADMIN — CLOPIDOGREL BISULFATE SCH MG: 75 TABLET ORAL at 11:35

## 2021-01-05 RX ADMIN — CLINDAMYCIN PHOSPHATE SCH MLS/HR: 6 INJECTION, SOLUTION INTRAVENOUS at 22:33

## 2021-01-05 RX ADMIN — ENALAPRIL MALEATE SCH MG: 2.5 TABLET ORAL at 11:35

## 2021-01-05 RX ADMIN — SPIRONOLACTONE SCH MG: 25 TABLET, FILM COATED ORAL at 22:00

## 2021-01-05 RX ADMIN — CLINDAMYCIN PHOSPHATE SCH MLS/HR: 6 INJECTION, SOLUTION INTRAVENOUS at 00:41

## 2021-01-05 RX ADMIN — CLINDAMYCIN PHOSPHATE SCH MLS/HR: 6 INJECTION, SOLUTION INTRAVENOUS at 14:40

## 2021-01-05 RX ADMIN — ENALAPRIL MALEATE SCH MG: 2.5 TABLET ORAL at 00:40

## 2021-01-05 RX ADMIN — ENALAPRIL MALEATE SCH MG: 2.5 TABLET ORAL at 22:00

## 2021-01-05 RX ADMIN — HYDROCORTISONE SCH APPLIC: 25 CREAM TOPICAL at 11:33

## 2021-01-05 RX ADMIN — ATORVASTATIN CALCIUM SCH MG: 20 TABLET, FILM COATED ORAL at 22:33

## 2021-01-05 RX ADMIN — FAMOTIDINE SCH MG: 20 TABLET, FILM COATED ORAL at 11:35

## 2021-01-06 VITALS — DIASTOLIC BLOOD PRESSURE: 54 MMHG | SYSTOLIC BLOOD PRESSURE: 124 MMHG

## 2021-01-06 VITALS — DIASTOLIC BLOOD PRESSURE: 55 MMHG | SYSTOLIC BLOOD PRESSURE: 144 MMHG

## 2021-01-06 VITALS — DIASTOLIC BLOOD PRESSURE: 74 MMHG | SYSTOLIC BLOOD PRESSURE: 126 MMHG

## 2021-01-06 VITALS — SYSTOLIC BLOOD PRESSURE: 138 MMHG | DIASTOLIC BLOOD PRESSURE: 75 MMHG

## 2021-01-06 VITALS — SYSTOLIC BLOOD PRESSURE: 118 MMHG | DIASTOLIC BLOOD PRESSURE: 59 MMHG

## 2021-01-06 VITALS — DIASTOLIC BLOOD PRESSURE: 65 MMHG | SYSTOLIC BLOOD PRESSURE: 120 MMHG

## 2021-01-06 RX ADMIN — CARVEDILOL SCH MG: 3.12 TABLET, FILM COATED ORAL at 09:21

## 2021-01-06 RX ADMIN — HYDROCORTISONE SCH APPLIC: 25 CREAM TOPICAL at 03:50

## 2021-01-06 RX ADMIN — SPIRONOLACTONE SCH MG: 25 TABLET, FILM COATED ORAL at 22:00

## 2021-01-06 RX ADMIN — CLOPIDOGREL BISULFATE SCH MG: 75 TABLET ORAL at 09:22

## 2021-01-06 RX ADMIN — ALBUTEROL SULFATE PRN MG: 2.5 SOLUTION RESPIRATORY (INHALATION) at 18:51

## 2021-01-06 RX ADMIN — CLINDAMYCIN PHOSPHATE SCH MLS/HR: 6 INJECTION, SOLUTION INTRAVENOUS at 06:17

## 2021-01-06 RX ADMIN — HYDROCORTISONE SCH APPLIC: 25 CREAM TOPICAL at 22:45

## 2021-01-06 RX ADMIN — CARVEDILOL SCH MG: 3.12 TABLET, FILM COATED ORAL at 22:00

## 2021-01-06 RX ADMIN — CLINDAMYCIN PHOSPHATE SCH MLS/HR: 6 INJECTION, SOLUTION INTRAVENOUS at 13:13

## 2021-01-06 RX ADMIN — ATORVASTATIN CALCIUM SCH MG: 20 TABLET, FILM COATED ORAL at 22:00

## 2021-01-06 RX ADMIN — CLINDAMYCIN PHOSPHATE SCH MLS/HR: 6 INJECTION, SOLUTION INTRAVENOUS at 22:27

## 2021-01-06 RX ADMIN — FAMOTIDINE SCH MG: 20 TABLET, FILM COATED ORAL at 09:21

## 2021-01-06 RX ADMIN — ENALAPRIL MALEATE SCH MG: 2.5 TABLET ORAL at 09:22

## 2021-01-06 RX ADMIN — IPRATROPIUM BROMIDE PRN MG: 0.5 SOLUTION RESPIRATORY (INHALATION) at 18:51

## 2021-01-06 RX ADMIN — ENALAPRIL MALEATE SCH MG: 2.5 TABLET ORAL at 22:00

## 2021-01-06 RX ADMIN — HYDROCORTISONE SCH APPLIC: 25 CREAM TOPICAL at 11:45

## 2021-01-06 RX ADMIN — FUROSEMIDE SCH MG: 10 INJECTION, SOLUTION INTRAMUSCULAR; INTRAVENOUS at 09:20

## 2021-01-06 RX ADMIN — SPIRONOLACTONE SCH MG: 25 TABLET, FILM COATED ORAL at 09:22

## 2021-01-07 VITALS — SYSTOLIC BLOOD PRESSURE: 145 MMHG | DIASTOLIC BLOOD PRESSURE: 82 MMHG

## 2021-01-07 VITALS — SYSTOLIC BLOOD PRESSURE: 146 MMHG | DIASTOLIC BLOOD PRESSURE: 77 MMHG

## 2021-01-07 VITALS — SYSTOLIC BLOOD PRESSURE: 121 MMHG | DIASTOLIC BLOOD PRESSURE: 66 MMHG

## 2021-01-07 VITALS — SYSTOLIC BLOOD PRESSURE: 112 MMHG | DIASTOLIC BLOOD PRESSURE: 64 MMHG

## 2021-01-07 RX ADMIN — HYDROCORTISONE SCH APPLIC: 25 CREAM TOPICAL at 22:00

## 2021-01-07 RX ADMIN — IPRATROPIUM BROMIDE SCH MG: 0.5 SOLUTION RESPIRATORY (INHALATION) at 19:01

## 2021-01-07 RX ADMIN — ATORVASTATIN CALCIUM SCH MG: 20 TABLET, FILM COATED ORAL at 22:01

## 2021-01-07 RX ADMIN — CLINDAMYCIN PHOSPHATE SCH MLS/HR: 6 INJECTION, SOLUTION INTRAVENOUS at 06:12

## 2021-01-07 RX ADMIN — ALBUTEROL SULFATE SCH MG: 2.5 SOLUTION RESPIRATORY (INHALATION) at 14:00

## 2021-01-07 RX ADMIN — ALBUTEROL SULFATE SCH MG: 2.5 SOLUTION RESPIRATORY (INHALATION) at 11:20

## 2021-01-07 RX ADMIN — CLOPIDOGREL BISULFATE SCH MG: 75 TABLET ORAL at 10:43

## 2021-01-07 RX ADMIN — METHYLPREDNISOLONE SODIUM SUCCINATE SCH MG: 125 INJECTION, POWDER, FOR SOLUTION INTRAMUSCULAR; INTRAVENOUS at 22:01

## 2021-01-07 RX ADMIN — SPIRONOLACTONE SCH MG: 25 TABLET, FILM COATED ORAL at 10:48

## 2021-01-07 RX ADMIN — ENALAPRIL MALEATE SCH MG: 2.5 TABLET ORAL at 10:49

## 2021-01-07 RX ADMIN — SPIRONOLACTONE SCH MG: 25 TABLET, FILM COATED ORAL at 22:02

## 2021-01-07 RX ADMIN — IPRATROPIUM BROMIDE PRN MG: 0.5 SOLUTION RESPIRATORY (INHALATION) at 03:16

## 2021-01-07 RX ADMIN — CLINDAMYCIN PHOSPHATE SCH MLS/HR: 6 INJECTION, SOLUTION INTRAVENOUS at 20:32

## 2021-01-07 RX ADMIN — HYDROCORTISONE SCH APPLIC: 25 CREAM TOPICAL at 10:44

## 2021-01-07 RX ADMIN — ALBUTEROL SULFATE SCH MG: 2.5 SOLUTION RESPIRATORY (INHALATION) at 19:01

## 2021-01-07 RX ADMIN — IPRATROPIUM BROMIDE PRN MG: 0.5 SOLUTION RESPIRATORY (INHALATION) at 06:55

## 2021-01-07 RX ADMIN — FAMOTIDINE SCH MG: 20 TABLET, FILM COATED ORAL at 10:43

## 2021-01-07 RX ADMIN — FUROSEMIDE SCH MG: 10 INJECTION, SOLUTION INTRAMUSCULAR; INTRAVENOUS at 10:49

## 2021-01-07 RX ADMIN — ALBUTEROL SULFATE SCH MG: 2.5 SOLUTION RESPIRATORY (INHALATION) at 22:57

## 2021-01-07 RX ADMIN — IPRATROPIUM BROMIDE SCH MG: 0.5 SOLUTION RESPIRATORY (INHALATION) at 14:00

## 2021-01-07 RX ADMIN — IPRATROPIUM BROMIDE SCH MG: 0.5 SOLUTION RESPIRATORY (INHALATION) at 22:57

## 2021-01-07 RX ADMIN — CLINDAMYCIN PHOSPHATE SCH MLS/HR: 6 INJECTION, SOLUTION INTRAVENOUS at 13:23

## 2021-01-07 RX ADMIN — CARVEDILOL SCH MG: 3.12 TABLET, FILM COATED ORAL at 10:48

## 2021-01-07 RX ADMIN — IPRATROPIUM BROMIDE SCH MG: 0.5 SOLUTION RESPIRATORY (INHALATION) at 11:20

## 2021-01-07 RX ADMIN — ALBUTEROL SULFATE PRN MG: 2.5 SOLUTION RESPIRATORY (INHALATION) at 06:55

## 2021-01-07 RX ADMIN — CARVEDILOL SCH MG: 3.12 TABLET, FILM COATED ORAL at 22:02

## 2021-01-07 RX ADMIN — ALBUTEROL SULFATE PRN MG: 2.5 SOLUTION RESPIRATORY (INHALATION) at 03:16

## 2021-01-08 VITALS — DIASTOLIC BLOOD PRESSURE: 52 MMHG | SYSTOLIC BLOOD PRESSURE: 116 MMHG

## 2021-01-08 VITALS — DIASTOLIC BLOOD PRESSURE: 76 MMHG | SYSTOLIC BLOOD PRESSURE: 132 MMHG

## 2021-01-08 VITALS — DIASTOLIC BLOOD PRESSURE: 75 MMHG | SYSTOLIC BLOOD PRESSURE: 135 MMHG

## 2021-01-08 RX ADMIN — METHYLPREDNISOLONE SODIUM SUCCINATE SCH MG: 125 INJECTION, POWDER, FOR SOLUTION INTRAMUSCULAR; INTRAVENOUS at 10:00

## 2021-01-08 RX ADMIN — ALBUTEROL SULFATE SCH MG: 2.5 SOLUTION RESPIRATORY (INHALATION) at 06:10

## 2021-01-08 RX ADMIN — CLOPIDOGREL BISULFATE SCH MG: 75 TABLET ORAL at 09:46

## 2021-01-08 RX ADMIN — ALBUTEROL SULFATE SCH MG: 2.5 SOLUTION RESPIRATORY (INHALATION) at 02:17

## 2021-01-08 RX ADMIN — CLINDAMYCIN PHOSPHATE SCH MLS/HR: 6 INJECTION, SOLUTION INTRAVENOUS at 05:59

## 2021-01-08 RX ADMIN — IPRATROPIUM BROMIDE SCH MG: 0.5 SOLUTION RESPIRATORY (INHALATION) at 14:38

## 2021-01-08 RX ADMIN — IPRATROPIUM BROMIDE SCH MG: 0.5 SOLUTION RESPIRATORY (INHALATION) at 02:17

## 2021-01-08 RX ADMIN — IPRATROPIUM BROMIDE SCH MG: 0.5 SOLUTION RESPIRATORY (INHALATION) at 06:10

## 2021-01-08 RX ADMIN — FUROSEMIDE SCH MG: 10 INJECTION, SOLUTION INTRAMUSCULAR; INTRAVENOUS at 10:00

## 2021-01-08 RX ADMIN — IPRATROPIUM BROMIDE SCH MG: 0.5 SOLUTION RESPIRATORY (INHALATION) at 10:38

## 2021-01-08 RX ADMIN — FAMOTIDINE SCH MG: 20 TABLET, FILM COATED ORAL at 09:46

## 2021-01-08 RX ADMIN — CARVEDILOL SCH MG: 3.12 TABLET, FILM COATED ORAL at 09:46

## 2021-01-08 RX ADMIN — SPIRONOLACTONE SCH MG: 25 TABLET, FILM COATED ORAL at 09:45

## 2021-01-08 RX ADMIN — HYDROCORTISONE SCH APPLIC: 25 CREAM TOPICAL at 09:46

## 2021-01-08 RX ADMIN — ALBUTEROL SULFATE SCH MG: 2.5 SOLUTION RESPIRATORY (INHALATION) at 10:38

## 2021-01-08 RX ADMIN — ALBUTEROL SULFATE SCH MG: 2.5 SOLUTION RESPIRATORY (INHALATION) at 14:38

## 2021-04-12 ENCOUNTER — HOSPITAL ENCOUNTER (OUTPATIENT)
Dept: HOSPITAL 15 - LAB | Age: 86
Discharge: HOME | End: 2021-04-12
Attending: INTERNAL MEDICINE
Payer: COMMERCIAL

## 2021-04-12 DIAGNOSIS — E11.9: Primary | ICD-10-CM

## 2021-04-12 LAB
ANION GAP SERPL CALCULATED.3IONS-SCNC: 9 MMOL/L (ref 5–15)
BUN SERPL-MCNC: 22 MG/DL (ref 7–18)
BUN/CREAT SERPL: 18.3
CALCIUM SERPL-MCNC: 9.2 MG/DL (ref 8.5–10.1)
CHLORIDE SERPL-SCNC: 112 MMOL/L (ref 98–107)
CO2 SERPL-SCNC: 18 MMOL/L (ref 21–32)
GLUCOSE SERPL-MCNC: 88 MG/DL (ref 74–106)
POTASSIUM SERPL-SCNC: 4.3 MMOL/L (ref 3.5–5.1)
SODIUM SERPL-SCNC: 139 MMOL/L (ref 136–145)

## 2021-04-12 PROCEDURE — 80048 BASIC METABOLIC PNL TOTAL CA: CPT

## 2021-04-12 PROCEDURE — 36415 COLL VENOUS BLD VENIPUNCTURE: CPT

## 2021-04-12 PROCEDURE — 83880 ASSAY OF NATRIURETIC PEPTIDE: CPT

## 2021-06-09 ENCOUNTER — HOSPITAL ENCOUNTER (OUTPATIENT)
Dept: HOSPITAL 15 - LAB | Age: 86
Discharge: HOME | End: 2021-06-09
Attending: INTERNAL MEDICINE
Payer: COMMERCIAL

## 2021-06-09 DIAGNOSIS — I10: Primary | ICD-10-CM

## 2021-06-09 LAB
ALBUMIN SERPL-MCNC: 3.9 G/DL (ref 3.4–5)
ALP SERPL-CCNC: 95 U/L (ref 45–117)
ALT SERPL-CCNC: 13 U/L (ref 16–61)
ANION GAP SERPL CALCULATED.3IONS-SCNC: 7 MMOL/L (ref 5–15)
BILIRUB SERPL-MCNC: 0.4 MG/DL (ref 0.2–1)
BUN SERPL-MCNC: 15 MG/DL (ref 7–18)
BUN/CREAT SERPL: 13.2
CALCIUM SERPL-MCNC: 9.3 MG/DL (ref 8.5–10.1)
CHLORIDE SERPL-SCNC: 110 MMOL/L (ref 98–107)
CO2 SERPL-SCNC: 24 MMOL/L (ref 21–32)
GLUCOSE SERPL-MCNC: 89 MG/DL (ref 74–106)
HCT VFR BLD AUTO: 38.8 % (ref 41–53)
HGB BLD-MCNC: 13 G/DL (ref 13.5–17.5)
MCH RBC QN AUTO: 29.9 PG (ref 28–32)
MCV RBC AUTO: 89 FL (ref 80–100)
NRBC BLD QL AUTO: 0.1 %
POTASSIUM SERPL-SCNC: 4.4 MMOL/L (ref 3.5–5.1)
PROT SERPL-MCNC: 7.4 G/DL (ref 6.4–8.2)
SODIUM SERPL-SCNC: 141 MMOL/L (ref 136–145)

## 2021-06-09 PROCEDURE — 80053 COMPREHEN METABOLIC PANEL: CPT

## 2021-06-09 PROCEDURE — 36415 COLL VENOUS BLD VENIPUNCTURE: CPT

## 2021-06-09 PROCEDURE — 85025 COMPLETE CBC W/AUTO DIFF WBC: CPT

## 2021-08-05 ENCOUNTER — HOSPITAL ENCOUNTER (OUTPATIENT)
Dept: HOSPITAL 15 - LAB | Age: 86
Discharge: HOME | End: 2021-08-05
Attending: INTERNAL MEDICINE
Payer: COMMERCIAL

## 2021-08-05 DIAGNOSIS — I34.0: ICD-10-CM

## 2021-08-05 DIAGNOSIS — I50.22: ICD-10-CM

## 2021-08-05 DIAGNOSIS — I34.1: ICD-10-CM

## 2021-08-05 DIAGNOSIS — I11.0: Primary | ICD-10-CM

## 2021-08-05 DIAGNOSIS — I25.119: ICD-10-CM

## 2021-08-05 LAB
ANION GAP SERPL CALCULATED.3IONS-SCNC: 6 MMOL/L (ref 5–15)
BUN SERPL-MCNC: 17 MG/DL (ref 7–18)
BUN/CREAT SERPL: 13.2
CALCIUM SERPL-MCNC: 8.5 MG/DL (ref 8.5–10.1)
CHLORIDE SERPL-SCNC: 110 MMOL/L (ref 98–107)
CO2 SERPL-SCNC: 22 MMOL/L (ref 21–32)
GLUCOSE SERPL-MCNC: 89 MG/DL (ref 74–106)
POTASSIUM SERPL-SCNC: 4 MMOL/L (ref 3.5–5.1)
SODIUM SERPL-SCNC: 138 MMOL/L (ref 136–145)

## 2021-08-05 PROCEDURE — 36415 COLL VENOUS BLD VENIPUNCTURE: CPT

## 2021-08-05 PROCEDURE — 80048 BASIC METABOLIC PNL TOTAL CA: CPT

## 2021-11-10 ENCOUNTER — HOSPITAL ENCOUNTER (OUTPATIENT)
Dept: HOSPITAL 15 - ER | Age: 86
Setting detail: OBSERVATION
LOS: 2 days | Discharge: HOME | End: 2021-11-12
Attending: INTERNAL MEDICINE | Admitting: NURSE PRACTITIONER
Payer: COMMERCIAL

## 2021-11-10 VITALS — WEIGHT: 94.36 LBS | HEIGHT: 60 IN | BODY MASS INDEX: 18.52 KG/M2

## 2021-11-10 DIAGNOSIS — Z95.1: ICD-10-CM

## 2021-11-10 DIAGNOSIS — I42.0: ICD-10-CM

## 2021-11-10 DIAGNOSIS — Z20.822: ICD-10-CM

## 2021-11-10 DIAGNOSIS — Z87.891: ICD-10-CM

## 2021-11-10 DIAGNOSIS — Z95.5: ICD-10-CM

## 2021-11-10 DIAGNOSIS — I13.0: ICD-10-CM

## 2021-11-10 DIAGNOSIS — I08.0: ICD-10-CM

## 2021-11-10 DIAGNOSIS — R73.03: ICD-10-CM

## 2021-11-10 DIAGNOSIS — D84.9: ICD-10-CM

## 2021-11-10 DIAGNOSIS — E78.5: ICD-10-CM

## 2021-11-10 DIAGNOSIS — Z66: ICD-10-CM

## 2021-11-10 DIAGNOSIS — R07.89: ICD-10-CM

## 2021-11-10 DIAGNOSIS — N18.30: ICD-10-CM

## 2021-11-10 DIAGNOSIS — Z79.82: ICD-10-CM

## 2021-11-10 DIAGNOSIS — Z86.718: ICD-10-CM

## 2021-11-10 DIAGNOSIS — I25.10: ICD-10-CM

## 2021-11-10 DIAGNOSIS — I50.23: ICD-10-CM

## 2021-11-10 DIAGNOSIS — I21.4: Primary | ICD-10-CM

## 2021-11-10 DIAGNOSIS — I27.20: ICD-10-CM

## 2021-11-10 DIAGNOSIS — Z90.49: ICD-10-CM

## 2021-11-10 DIAGNOSIS — R74.01: ICD-10-CM

## 2021-11-10 DIAGNOSIS — Z86.73: ICD-10-CM

## 2021-11-10 PROCEDURE — 81001 URINALYSIS AUTO W/SCOPE: CPT

## 2021-11-10 PROCEDURE — 71045 X-RAY EXAM CHEST 1 VIEW: CPT

## 2021-11-10 PROCEDURE — 94660 CPAP INITIATION&MGMT: CPT

## 2021-11-10 PROCEDURE — 96375 TX/PRO/DX INJ NEW DRUG ADDON: CPT

## 2021-11-10 PROCEDURE — 99291 CRITICAL CARE FIRST HOUR: CPT

## 2021-11-10 PROCEDURE — 80053 COMPREHEN METABOLIC PANEL: CPT

## 2021-11-10 PROCEDURE — 96365 THER/PROPH/DIAG IV INF INIT: CPT

## 2021-11-10 PROCEDURE — 96376 TX/PRO/DX INJ SAME DRUG ADON: CPT

## 2021-11-10 PROCEDURE — 96366 THER/PROPH/DIAG IV INF ADDON: CPT

## 2021-11-10 PROCEDURE — 82805 BLOOD GASES W/O2 SATURATION: CPT

## 2021-11-10 PROCEDURE — 96372 THER/PROPH/DIAG INJ SC/IM: CPT

## 2021-11-10 PROCEDURE — 83880 ASSAY OF NATRIURETIC PEPTIDE: CPT

## 2021-11-10 PROCEDURE — 36415 COLL VENOUS BLD VENIPUNCTURE: CPT

## 2021-11-10 PROCEDURE — 36600 WITHDRAWAL OF ARTERIAL BLOOD: CPT

## 2021-11-10 PROCEDURE — 83735 ASSAY OF MAGNESIUM: CPT

## 2021-11-10 PROCEDURE — 71250 CT THORAX DX C-: CPT

## 2021-11-10 PROCEDURE — 84484 ASSAY OF TROPONIN QUANT: CPT

## 2021-11-10 PROCEDURE — 80048 BASIC METABOLIC PNL TOTAL CA: CPT

## 2021-11-10 PROCEDURE — 93306 TTE W/DOPPLER COMPLETE: CPT

## 2021-11-10 PROCEDURE — 96367 TX/PROPH/DG ADDL SEQ IV INF: CPT

## 2021-11-10 PROCEDURE — 87040 BLOOD CULTURE FOR BACTERIA: CPT

## 2021-11-10 PROCEDURE — 83036 HEMOGLOBIN GLYCOSYLATED A1C: CPT

## 2021-11-10 PROCEDURE — 93005 ELECTROCARDIOGRAM TRACING: CPT

## 2021-11-10 PROCEDURE — 83605 ASSAY OF LACTIC ACID: CPT

## 2021-11-10 PROCEDURE — 87426 SARSCOV CORONAVIRUS AG IA: CPT

## 2021-11-10 PROCEDURE — 85025 COMPLETE CBC W/AUTO DIFF WBC: CPT

## 2021-11-10 PROCEDURE — 84100 ASSAY OF PHOSPHORUS: CPT

## 2021-11-11 VITALS — DIASTOLIC BLOOD PRESSURE: 66 MMHG | SYSTOLIC BLOOD PRESSURE: 112 MMHG

## 2021-11-11 VITALS — SYSTOLIC BLOOD PRESSURE: 98 MMHG | DIASTOLIC BLOOD PRESSURE: 45 MMHG

## 2021-11-11 VITALS — DIASTOLIC BLOOD PRESSURE: 58 MMHG | SYSTOLIC BLOOD PRESSURE: 108 MMHG

## 2021-11-11 VITALS — SYSTOLIC BLOOD PRESSURE: 164 MMHG | DIASTOLIC BLOOD PRESSURE: 89 MMHG

## 2021-11-11 VITALS — DIASTOLIC BLOOD PRESSURE: 74 MMHG | SYSTOLIC BLOOD PRESSURE: 141 MMHG

## 2021-11-11 VITALS — DIASTOLIC BLOOD PRESSURE: 67 MMHG | SYSTOLIC BLOOD PRESSURE: 110 MMHG

## 2021-11-11 VITALS — SYSTOLIC BLOOD PRESSURE: 98 MMHG | DIASTOLIC BLOOD PRESSURE: 43 MMHG

## 2021-11-11 VITALS — DIASTOLIC BLOOD PRESSURE: 76 MMHG | SYSTOLIC BLOOD PRESSURE: 137 MMHG

## 2021-11-11 LAB
ALBUMIN SERPL-MCNC: 3.2 G/DL (ref 3.4–5)
ALP SERPL-CCNC: 128 U/L (ref 45–117)
ALT SERPL-CCNC: 46 U/L (ref 16–61)
ANION GAP SERPL CALCULATED.3IONS-SCNC: 9 MMOL/L (ref 5–15)
ANION GAP SERPL CALCULATED.3IONS-SCNC: 9 MMOL/L (ref 5–15)
BILIRUB SERPL-MCNC: 0.5 MG/DL (ref 0.2–1)
BUN SERPL-MCNC: 17 MG/DL (ref 7–18)
BUN SERPL-MCNC: 18 MG/DL (ref 7–18)
BUN/CREAT SERPL: 14.5
BUN/CREAT SERPL: 15.2
CALCIUM SERPL-MCNC: 8.5 MG/DL (ref 8.5–10.1)
CALCIUM SERPL-MCNC: 8.7 MG/DL (ref 8.5–10.1)
CHLORIDE SERPL-SCNC: 108 MMOL/L (ref 98–107)
CHLORIDE SERPL-SCNC: 113 MMOL/L (ref 98–107)
CO2 SERPL-SCNC: 19 MMOL/L (ref 21–32)
CO2 SERPL-SCNC: 23 MMOL/L (ref 21–32)
GLUCOSE SERPL-MCNC: 125 MG/DL (ref 74–106)
GLUCOSE SERPL-MCNC: 133 MG/DL (ref 74–106)
HCT VFR BLD AUTO: 43.8 % (ref 41–53)
HGB BLD-MCNC: 14 G/DL (ref 13.5–17.5)
MAGNESIUM SERPL-MCNC: 2.1 MG/DL (ref 1.6–2.6)
MCH RBC QN AUTO: 25.8 PG (ref 28–32)
MCV RBC AUTO: 80.8 FL (ref 80–100)
NRBC BLD QL AUTO: 0.1 %
POTASSIUM SERPL-SCNC: 3.9 MMOL/L (ref 3.5–5.1)
POTASSIUM SERPL-SCNC: 4.2 MMOL/L (ref 3.5–5.1)
PROT SERPL-MCNC: 7.2 G/DL (ref 6.4–8.2)
SODIUM SERPL-SCNC: 140 MMOL/L (ref 136–145)
SODIUM SERPL-SCNC: 141 MMOL/L (ref 136–145)

## 2021-11-11 RX ADMIN — CARVEDILOL SCH MG: 3.12 TABLET, FILM COATED ORAL at 09:42

## 2021-11-11 RX ADMIN — FUROSEMIDE SCH MG: 10 INJECTION, SOLUTION INTRAMUSCULAR; INTRAVENOUS at 17:04

## 2021-11-11 RX ADMIN — SACUBITRIL AND VALSARTAN SCH TAB: 24; 26 TABLET, FILM COATED ORAL at 22:05

## 2021-11-11 RX ADMIN — ENOXAPARIN SODIUM SCH MG: 40 INJECTION SUBCUTANEOUS at 09:42

## 2021-11-11 RX ADMIN — CARVEDILOL SCH MG: 3.12 TABLET, FILM COATED ORAL at 22:00

## 2021-11-11 RX ADMIN — FUROSEMIDE SCH MG: 10 INJECTION, SOLUTION INTRAMUSCULAR; INTRAVENOUS at 06:16

## 2021-11-12 VITALS — SYSTOLIC BLOOD PRESSURE: 103 MMHG | DIASTOLIC BLOOD PRESSURE: 52 MMHG

## 2021-11-12 VITALS — DIASTOLIC BLOOD PRESSURE: 52 MMHG | SYSTOLIC BLOOD PRESSURE: 103 MMHG

## 2021-11-12 VITALS — SYSTOLIC BLOOD PRESSURE: 109 MMHG | DIASTOLIC BLOOD PRESSURE: 59 MMHG

## 2021-11-12 VITALS — SYSTOLIC BLOOD PRESSURE: 136 MMHG | DIASTOLIC BLOOD PRESSURE: 57 MMHG

## 2021-11-12 VITALS — SYSTOLIC BLOOD PRESSURE: 95 MMHG | DIASTOLIC BLOOD PRESSURE: 42 MMHG

## 2021-11-12 LAB
ALBUMIN SERPL-MCNC: 2.7 G/DL (ref 3.4–5)
ALP SERPL-CCNC: 95 U/L (ref 45–117)
ALT SERPL-CCNC: 26 U/L (ref 16–61)
ANION GAP SERPL CALCULATED.3IONS-SCNC: 9 MMOL/L (ref 5–15)
BILIRUB SERPL-MCNC: 0.6 MG/DL (ref 0.2–1)
BUN SERPL-MCNC: 18 MG/DL (ref 7–18)
BUN/CREAT SERPL: 16.4
CALCIUM SERPL-MCNC: 8.1 MG/DL (ref 8.5–10.1)
CHLORIDE SERPL-SCNC: 108 MMOL/L (ref 98–107)
CO2 SERPL-SCNC: 24 MMOL/L (ref 21–32)
GLUCOSE SERPL-MCNC: 86 MG/DL (ref 74–106)
HCT VFR BLD AUTO: 38.3 % (ref 41–53)
HGB BLD-MCNC: 12.6 G/DL (ref 13.5–17.5)
MAGNESIUM SERPL-MCNC: 2 MG/DL (ref 1.6–2.6)
MCH RBC QN AUTO: 25.8 PG (ref 28–32)
MCV RBC AUTO: 78.7 FL (ref 80–100)
NRBC BLD QL AUTO: 0.1 %
POTASSIUM SERPL-SCNC: 3.7 MMOL/L (ref 3.5–5.1)
PROT SERPL-MCNC: 5.8 G/DL (ref 6.4–8.2)
SODIUM SERPL-SCNC: 141 MMOL/L (ref 136–145)

## 2021-11-12 RX ADMIN — FUROSEMIDE SCH MG: 10 INJECTION, SOLUTION INTRAMUSCULAR; INTRAVENOUS at 06:24

## 2021-11-12 RX ADMIN — CARVEDILOL SCH MG: 3.12 TABLET, FILM COATED ORAL at 09:10

## 2021-11-12 RX ADMIN — SACUBITRIL AND VALSARTAN SCH TAB: 24; 26 TABLET, FILM COATED ORAL at 09:09

## 2021-11-12 RX ADMIN — ENOXAPARIN SODIUM SCH MG: 40 INJECTION SUBCUTANEOUS at 09:11

## 2022-01-03 ENCOUNTER — HOSPITAL ENCOUNTER (OUTPATIENT)
Dept: HOSPITAL 15 - LAB | Age: 87
Discharge: HOME | End: 2022-01-03
Attending: INTERNAL MEDICINE
Payer: COMMERCIAL

## 2022-01-03 DIAGNOSIS — I11.0: Primary | ICD-10-CM

## 2022-01-03 DIAGNOSIS — I50.23: ICD-10-CM

## 2022-01-03 LAB
CHOLEST SERPL-MCNC: 153 MG/DL (ref ?–200)
HDLC SERPL-MCNC: 56 MG/DL (ref 40–59)
TRIGL SERPL-MCNC: 177 MG/DL (ref ?–150)
WBC CLUMPS UR QL AUTO: PRESENT /HPF

## 2022-01-03 PROCEDURE — 83880 ASSAY OF NATRIURETIC PEPTIDE: CPT

## 2022-01-03 PROCEDURE — 80061 LIPID PANEL: CPT

## 2022-01-03 PROCEDURE — 36415 COLL VENOUS BLD VENIPUNCTURE: CPT

## 2022-01-03 PROCEDURE — 81001 URINALYSIS AUTO W/SCOPE: CPT

## 2022-02-08 ENCOUNTER — HOSPITAL ENCOUNTER (OUTPATIENT)
Dept: HOSPITAL 15 - XY | Age: 87
Discharge: HOME | End: 2022-02-08
Attending: INTERNAL MEDICINE
Payer: COMMERCIAL

## 2022-02-08 DIAGNOSIS — Z86.73: ICD-10-CM

## 2022-02-08 DIAGNOSIS — I65.21: Primary | ICD-10-CM

## 2022-02-08 PROCEDURE — 93886 INTRACRANIAL COMPLETE STUDY: CPT

## 2022-04-07 ENCOUNTER — HOSPITAL ENCOUNTER (OUTPATIENT)
Dept: HOSPITAL 15 - LAB | Age: 87
Discharge: HOME | End: 2022-04-07
Attending: INTERNAL MEDICINE
Payer: MEDICARE

## 2022-04-07 DIAGNOSIS — A18.2: ICD-10-CM

## 2022-04-07 DIAGNOSIS — G45.9: ICD-10-CM

## 2022-04-07 DIAGNOSIS — R73.03: ICD-10-CM

## 2022-04-07 DIAGNOSIS — I25.10: Primary | ICD-10-CM

## 2022-04-07 LAB
ALBUMIN SERPL-MCNC: 2.9 G/DL (ref 3.4–5)
ALP SERPL-CCNC: 83 U/L (ref 45–117)
ALT SERPL-CCNC: 11 U/L (ref 16–61)
ANION GAP SERPL CALCULATED.3IONS-SCNC: 9 MMOL/L (ref 5–15)
BILIRUB SERPL-MCNC: 0.4 MG/DL (ref 0.2–1)
BUN SERPL-MCNC: 14 MG/DL (ref 7–18)
BUN/CREAT SERPL: 10.2
CALCIUM SERPL-MCNC: 8.7 MG/DL (ref 8.5–10.1)
CHLORIDE SERPL-SCNC: 111 MMOL/L (ref 98–107)
CO2 SERPL-SCNC: 21 MMOL/L (ref 21–32)
GLUCOSE SERPL-MCNC: 94 MG/DL (ref 74–106)
HCT VFR BLD AUTO: 39.7 % (ref 41–53)
HGB BLD-MCNC: 12.8 G/DL (ref 13.5–17.5)
MCH RBC QN AUTO: 27.3 PG (ref 28–32)
MCV RBC AUTO: 84.6 FL (ref 80–100)
NRBC BLD QL AUTO: 0.1 %
POTASSIUM SERPL-SCNC: 3.9 MMOL/L (ref 3.5–5.1)
PROT SERPL-MCNC: 7.1 G/DL (ref 6.4–8.2)
SODIUM SERPL-SCNC: 141 MMOL/L (ref 136–145)

## 2022-04-07 PROCEDURE — 85025 COMPLETE CBC W/AUTO DIFF WBC: CPT

## 2022-04-07 PROCEDURE — 80053 COMPREHEN METABOLIC PANEL: CPT

## 2022-04-07 PROCEDURE — 36415 COLL VENOUS BLD VENIPUNCTURE: CPT

## 2022-04-07 PROCEDURE — 83036 HEMOGLOBIN GLYCOSYLATED A1C: CPT

## 2022-08-16 ENCOUNTER — HOSPITAL ENCOUNTER (OUTPATIENT)
Dept: HOSPITAL 15 - LAB | Age: 87
Discharge: HOME | End: 2022-08-16
Attending: INTERNAL MEDICINE
Payer: MEDICARE

## 2022-08-16 DIAGNOSIS — I10: ICD-10-CM

## 2022-08-16 DIAGNOSIS — R73.03: ICD-10-CM

## 2022-08-16 DIAGNOSIS — I42.9: Primary | ICD-10-CM

## 2022-08-16 LAB
ALBUMIN SERPL-MCNC: 3.4 G/DL (ref 3.4–5)
ALP SERPL-CCNC: 84 U/L (ref 45–117)
ALT SERPL-CCNC: 13 U/L (ref 16–61)
ANION GAP SERPL CALCULATED.3IONS-SCNC: 11 MMOL/L (ref 5–15)
BILIRUB SERPL-MCNC: 0.5 MG/DL (ref 0.2–1)
BUN SERPL-MCNC: 31 MG/DL (ref 7–18)
BUN/CREAT SERPL: 16.8
CALCIUM SERPL-MCNC: 8.9 MG/DL (ref 8.5–10.1)
CHLORIDE SERPL-SCNC: 106 MMOL/L (ref 98–107)
CO2 SERPL-SCNC: 21 MMOL/L (ref 21–32)
GLUCOSE SERPL-MCNC: 114 MG/DL (ref 74–106)
POTASSIUM SERPL-SCNC: 3.6 MMOL/L (ref 3.5–5.1)
PROT SERPL-MCNC: 7.8 G/DL (ref 6.4–8.2)
SODIUM SERPL-SCNC: 138 MMOL/L (ref 136–145)

## 2022-08-16 PROCEDURE — 36415 COLL VENOUS BLD VENIPUNCTURE: CPT

## 2022-08-16 PROCEDURE — 80053 COMPREHEN METABOLIC PANEL: CPT

## 2022-09-06 ENCOUNTER — HOSPITAL ENCOUNTER (OUTPATIENT)
Dept: HOSPITAL 15 - XYW | Age: 87
Discharge: HOME | End: 2022-09-06
Attending: INTERNAL MEDICINE
Payer: COMMERCIAL

## 2022-09-06 DIAGNOSIS — I11.0: ICD-10-CM

## 2022-09-06 DIAGNOSIS — I25.10: ICD-10-CM

## 2022-09-06 DIAGNOSIS — I08.3: Primary | ICD-10-CM

## 2022-09-06 PROCEDURE — 93306 TTE W/DOPPLER COMPLETE: CPT

## 2022-09-13 ENCOUNTER — HOSPITAL ENCOUNTER (OUTPATIENT)
Dept: HOSPITAL 15 - LAB | Age: 87
Discharge: HOME | End: 2022-09-13
Attending: INTERNAL MEDICINE
Payer: COMMERCIAL

## 2022-09-13 DIAGNOSIS — I10: ICD-10-CM

## 2022-09-13 DIAGNOSIS — E11.9: Primary | ICD-10-CM

## 2022-09-13 LAB
ANION GAP SERPL CALCULATED.3IONS-SCNC: 10 MMOL/L (ref 5–15)
BUN SERPL-MCNC: 32 MG/DL (ref 7–18)
BUN/CREAT SERPL: 18.8
CALCIUM SERPL-MCNC: 8.1 MG/DL (ref 8.5–10.1)
CHLORIDE SERPL-SCNC: 106 MMOL/L (ref 98–107)
CO2 SERPL-SCNC: 21 MMOL/L (ref 21–32)
GLUCOSE SERPL-MCNC: 152 MG/DL (ref 74–106)
POTASSIUM SERPL-SCNC: 3.7 MMOL/L (ref 3.5–5.1)
SODIUM SERPL-SCNC: 137 MMOL/L (ref 136–145)

## 2022-09-13 PROCEDURE — 36415 COLL VENOUS BLD VENIPUNCTURE: CPT

## 2022-09-13 PROCEDURE — 80048 BASIC METABOLIC PNL TOTAL CA: CPT

## 2022-10-03 ENCOUNTER — HOSPITAL ENCOUNTER (OUTPATIENT)
Dept: HOSPITAL 15 - LAB | Age: 87
Discharge: HOME | End: 2022-10-03
Attending: INTERNAL MEDICINE
Payer: COMMERCIAL

## 2022-10-03 DIAGNOSIS — N39.0: Primary | ICD-10-CM

## 2022-10-03 LAB
WBC CLUMPS UR QL AUTO: PRESENT /HPF
YEAST # UR AUTO: (no result) /HPF

## 2022-10-03 PROCEDURE — 81001 URINALYSIS AUTO W/SCOPE: CPT

## 2022-10-19 ENCOUNTER — HOSPITAL ENCOUNTER (OUTPATIENT)
Dept: HOSPITAL 15 - LAB | Age: 87
Discharge: HOME | End: 2022-10-19
Attending: INTERNAL MEDICINE
Payer: COMMERCIAL

## 2022-10-19 DIAGNOSIS — I13.0: Primary | ICD-10-CM

## 2022-10-19 DIAGNOSIS — N18.30: ICD-10-CM

## 2022-10-19 DIAGNOSIS — I50.22: ICD-10-CM

## 2022-10-19 LAB
ANION GAP SERPL CALCULATED.3IONS-SCNC: 11 MMOL/L (ref 5–15)
BUN SERPL-MCNC: 29 MG/DL (ref 7–18)
BUN/CREAT SERPL: 14.1
CALCIUM SERPL-MCNC: 8.5 MG/DL (ref 8.5–10.1)
CHLORIDE SERPL-SCNC: 107 MMOL/L (ref 98–107)
CO2 SERPL-SCNC: 21 MMOL/L (ref 21–32)
GLUCOSE SERPL-MCNC: 99 MG/DL (ref 74–106)
POTASSIUM SERPL-SCNC: 4 MMOL/L (ref 3.5–5.1)
SODIUM SERPL-SCNC: 139 MMOL/L (ref 136–145)

## 2022-10-19 PROCEDURE — 83880 ASSAY OF NATRIURETIC PEPTIDE: CPT

## 2022-10-19 PROCEDURE — 80048 BASIC METABOLIC PNL TOTAL CA: CPT

## 2022-10-19 PROCEDURE — 36415 COLL VENOUS BLD VENIPUNCTURE: CPT

## 2022-10-25 ENCOUNTER — HOSPITAL ENCOUNTER (OUTPATIENT)
Dept: HOSPITAL 15 - LAB | Age: 87
Discharge: HOME | End: 2022-10-25
Attending: INTERNAL MEDICINE
Payer: COMMERCIAL

## 2022-10-25 DIAGNOSIS — I50.22: ICD-10-CM

## 2022-10-25 DIAGNOSIS — I42.0: ICD-10-CM

## 2022-10-25 DIAGNOSIS — N18.31: ICD-10-CM

## 2022-10-25 DIAGNOSIS — I13.0: Primary | ICD-10-CM

## 2022-10-25 LAB
ANION GAP SERPL CALCULATED.3IONS-SCNC: 5 MMOL/L (ref 5–15)
BUN SERPL-MCNC: 21 MG/DL (ref 7–18)
BUN/CREAT SERPL: 14.4
CALCIUM SERPL-MCNC: 8.2 MG/DL (ref 8.5–10.1)
CHLORIDE SERPL-SCNC: 111 MMOL/L (ref 98–107)
CO2 SERPL-SCNC: 23 MMOL/L (ref 21–32)
GLUCOSE SERPL-MCNC: 99 MG/DL (ref 74–106)
POTASSIUM SERPL-SCNC: 3.7 MMOL/L (ref 3.5–5.1)
SODIUM SERPL-SCNC: 139 MMOL/L (ref 136–145)

## 2022-10-25 PROCEDURE — 36415 COLL VENOUS BLD VENIPUNCTURE: CPT

## 2022-10-25 PROCEDURE — 80048 BASIC METABOLIC PNL TOTAL CA: CPT

## 2023-01-06 ENCOUNTER — HOSPITAL ENCOUNTER (INPATIENT)
Dept: HOSPITAL 15 - ER | Age: 88
LOS: 8 days | Discharge: HOSPICE | DRG: 388 | End: 2023-01-14
Attending: INTERNAL MEDICINE | Admitting: STUDENT IN AN ORGANIZED HEALTH CARE EDUCATION/TRAINING PROGRAM
Payer: COMMERCIAL

## 2023-01-06 VITALS — WEIGHT: 89.73 LBS | HEIGHT: 60 IN | BODY MASS INDEX: 17.62 KG/M2

## 2023-01-06 DIAGNOSIS — I11.0: ICD-10-CM

## 2023-01-06 DIAGNOSIS — I69.354: ICD-10-CM

## 2023-01-06 DIAGNOSIS — N30.90: ICD-10-CM

## 2023-01-06 DIAGNOSIS — K56.609: Primary | ICD-10-CM

## 2023-01-06 DIAGNOSIS — R19.7: ICD-10-CM

## 2023-01-06 DIAGNOSIS — I50.9: ICD-10-CM

## 2023-01-06 DIAGNOSIS — B96.20: ICD-10-CM

## 2023-01-06 DIAGNOSIS — N17.9: ICD-10-CM

## 2023-01-06 DIAGNOSIS — E87.0: ICD-10-CM

## 2023-01-06 DIAGNOSIS — E87.6: ICD-10-CM

## 2023-01-06 DIAGNOSIS — E78.5: ICD-10-CM

## 2023-01-06 DIAGNOSIS — Z88.0: ICD-10-CM

## 2023-01-06 DIAGNOSIS — Z20.822: ICD-10-CM

## 2023-01-06 DIAGNOSIS — E43: ICD-10-CM

## 2023-01-06 DIAGNOSIS — J44.9: ICD-10-CM

## 2023-01-06 DIAGNOSIS — E86.0: ICD-10-CM

## 2023-01-06 DIAGNOSIS — I25.10: ICD-10-CM

## 2023-01-06 DIAGNOSIS — E87.20: ICD-10-CM

## 2023-01-06 LAB
ALBUMIN SERPL-MCNC: 2.9 G/DL (ref 3.4–5)
ALP SERPL-CCNC: 99 U/L (ref 45–117)
ALT SERPL-CCNC: 10 U/L (ref 16–61)
ANION GAP SERPL CALCULATED.3IONS-SCNC: 10 MMOL/L (ref 5–15)
BILIRUB SERPL-MCNC: 0.4 MG/DL (ref 0.2–1)
BUN SERPL-MCNC: 27 MG/DL (ref 7–18)
BUN/CREAT SERPL: 16.1
CALCIUM SERPL-MCNC: 8.5 MG/DL (ref 8.5–10.1)
CHLORIDE SERPL-SCNC: 106 MMOL/L (ref 98–107)
CO2 SERPL-SCNC: 23 MMOL/L (ref 21–32)
GLUCOSE SERPL-MCNC: 105 MG/DL (ref 74–106)
HCT VFR BLD AUTO: 40.3 % (ref 41–53)
HGB BLD-MCNC: 12.9 G/DL (ref 13.5–17.5)
LACTATE PLASV-SCNC: 3.5 MMOL/L (ref 0.4–2)
LIPASE SERPL-CCNC: 37 U/L (ref 73–393)
MAGNESIUM SERPL-MCNC: 2.4 MG/DL (ref 1.6–2.6)
MCH RBC QN AUTO: 26.5 PG (ref 28–32)
MCV RBC AUTO: 82.6 FL (ref 80–100)
NRBC BLD QL AUTO: 0.1 %
POTASSIUM SERPL-SCNC: 3 MMOL/L (ref 3.5–5.1)
PROT SERPL-MCNC: 7.4 G/DL (ref 6.4–8.2)
SODIUM SERPL-SCNC: 139 MMOL/L (ref 136–145)

## 2023-01-06 PROCEDURE — 83930 ASSAY OF BLOOD OSMOLALITY: CPT

## 2023-01-06 PROCEDURE — 82378 CARCINOEMBRYONIC ANTIGEN: CPT

## 2023-01-06 PROCEDURE — 87426 SARSCOV CORONAVIRUS AG IA: CPT

## 2023-01-06 PROCEDURE — 94640 AIRWAY INHALATION TREATMENT: CPT

## 2023-01-06 PROCEDURE — 96375 TX/PRO/DX INJ NEW DRUG ADDON: CPT

## 2023-01-06 PROCEDURE — 83880 ASSAY OF NATRIURETIC PEPTIDE: CPT

## 2023-01-06 PROCEDURE — 74250 X-RAY XM SM INT 1CNTRST STD: CPT

## 2023-01-06 PROCEDURE — 82962 GLUCOSE BLOOD TEST: CPT

## 2023-01-06 PROCEDURE — 87186 SC STD MICRODIL/AGAR DIL: CPT

## 2023-01-06 PROCEDURE — 84550 ASSAY OF BLOOD/URIC ACID: CPT

## 2023-01-06 PROCEDURE — 87804 INFLUENZA ASSAY W/OPTIC: CPT

## 2023-01-06 PROCEDURE — 85730 THROMBOPLASTIN TIME PARTIAL: CPT

## 2023-01-06 PROCEDURE — 71045 X-RAY EXAM CHEST 1 VIEW: CPT

## 2023-01-06 PROCEDURE — 83735 ASSAY OF MAGNESIUM: CPT

## 2023-01-06 PROCEDURE — 87088 URINE BACTERIA CULTURE: CPT

## 2023-01-06 PROCEDURE — 36600 WITHDRAWAL OF ARTERIAL BLOOD: CPT

## 2023-01-06 PROCEDURE — 93005 ELECTROCARDIOGRAM TRACING: CPT

## 2023-01-06 PROCEDURE — 84300 ASSAY OF URINE SODIUM: CPT

## 2023-01-06 PROCEDURE — 83935 ASSAY OF URINE OSMOLALITY: CPT

## 2023-01-06 PROCEDURE — 80053 COMPREHEN METABOLIC PANEL: CPT

## 2023-01-06 PROCEDURE — 85610 PROTHROMBIN TIME: CPT

## 2023-01-06 PROCEDURE — 80048 BASIC METABOLIC PNL TOTAL CA: CPT

## 2023-01-06 PROCEDURE — 81001 URINALYSIS AUTO W/SCOPE: CPT

## 2023-01-06 PROCEDURE — 84154 ASSAY OF PSA FREE: CPT

## 2023-01-06 PROCEDURE — 85025 COMPLETE CBC W/AUTO DIFF WBC: CPT

## 2023-01-06 PROCEDURE — 97163 PT EVAL HIGH COMPLEX 45 MIN: CPT

## 2023-01-06 PROCEDURE — 36415 COLL VENOUS BLD VENIPUNCTURE: CPT

## 2023-01-06 PROCEDURE — 83605 ASSAY OF LACTIC ACID: CPT

## 2023-01-06 PROCEDURE — 84484 ASSAY OF TROPONIN QUANT: CPT

## 2023-01-06 PROCEDURE — 87086 URINE CULTURE/COLONY COUNT: CPT

## 2023-01-06 PROCEDURE — 96374 THER/PROPH/DIAG INJ IV PUSH: CPT

## 2023-01-06 PROCEDURE — 82570 ASSAY OF URINE CREATININE: CPT

## 2023-01-06 PROCEDURE — 84100 ASSAY OF PHOSPHORUS: CPT

## 2023-01-06 PROCEDURE — 74176 CT ABD & PELVIS W/O CONTRAST: CPT

## 2023-01-06 PROCEDURE — 84132 ASSAY OF SERUM POTASSIUM: CPT

## 2023-01-06 PROCEDURE — 82805 BLOOD GASES W/O2 SATURATION: CPT

## 2023-01-06 PROCEDURE — 96361 HYDRATE IV INFUSION ADD-ON: CPT

## 2023-01-06 PROCEDURE — 83690 ASSAY OF LIPASE: CPT

## 2023-01-06 PROCEDURE — 85379 FIBRIN DEGRADATION QUANT: CPT

## 2023-01-07 VITALS — DIASTOLIC BLOOD PRESSURE: 49 MMHG | SYSTOLIC BLOOD PRESSURE: 109 MMHG

## 2023-01-07 VITALS — DIASTOLIC BLOOD PRESSURE: 61 MMHG | SYSTOLIC BLOOD PRESSURE: 108 MMHG

## 2023-01-07 VITALS — SYSTOLIC BLOOD PRESSURE: 109 MMHG | DIASTOLIC BLOOD PRESSURE: 49 MMHG

## 2023-01-07 LAB
ALBUMIN SERPL-MCNC: 2.4 G/DL (ref 3.4–5)
ALP SERPL-CCNC: 77 U/L (ref 45–117)
ALT SERPL-CCNC: 10 U/L (ref 16–61)
ANION GAP SERPL CALCULATED.3IONS-SCNC: 11 MMOL/L (ref 5–15)
ANION GAP SERPL CALCULATED.3IONS-SCNC: 11 MMOL/L (ref 5–15)
BILIRUB SERPL-MCNC: 0.4 MG/DL (ref 0.2–1)
BUN SERPL-MCNC: 34 MG/DL (ref 7–18)
BUN SERPL-MCNC: 36 MG/DL (ref 7–18)
BUN/CREAT SERPL: 17
BUN/CREAT SERPL: 18.8
CALCIUM SERPL-MCNC: 8.2 MG/DL (ref 8.5–10.1)
CALCIUM SERPL-MCNC: 8.8 MG/DL (ref 8.5–10.1)
CHLORIDE SERPL-SCNC: 108 MMOL/L (ref 98–107)
CHLORIDE SERPL-SCNC: 110 MMOL/L (ref 98–107)
CO2 SERPL-SCNC: 21 MMOL/L (ref 21–32)
CO2 SERPL-SCNC: 22 MMOL/L (ref 21–32)
GLUCOSE SERPL-MCNC: 103 MG/DL (ref 74–106)
GLUCOSE SERPL-MCNC: 111 MG/DL (ref 74–106)
HCT VFR BLD AUTO: 33.7 % (ref 41–53)
HGB BLD-MCNC: 11.3 G/DL (ref 13.5–17.5)
LACTATE PLASV-SCNC: 2.3 MMOL/L (ref 0.4–2)
MCH RBC QN AUTO: 27.9 PG (ref 28–32)
MCV RBC AUTO: 83.2 FL (ref 80–100)
NRBC BLD QL AUTO: 0 %
POTASSIUM SERPL-SCNC: 2.9 MMOL/L (ref 3.5–5.1)
POTASSIUM SERPL-SCNC: 3 MMOL/L (ref 3.5–5.1)
PROT SERPL-MCNC: 6.1 G/DL (ref 6.4–8.2)
SODIUM SERPL-SCNC: 141 MMOL/L (ref 136–145)
SODIUM SERPL-SCNC: 142 MMOL/L (ref 136–145)
URATE SERPL-MCNC: 8.7 MG/DL (ref 3.5–7.2)

## 2023-01-07 PROCEDURE — 05H933Z INSERTION OF INFUSION DEVICE INTO RIGHT BRACHIAL VEIN, PERCUTANEOUS APPROACH: ICD-10-PCS | Performed by: INTERNAL MEDICINE

## 2023-01-07 PROCEDURE — B54MZZA ULTRASONOGRAPHY OF RIGHT UPPER EXTREMITY VEINS, GUIDANCE: ICD-10-PCS | Performed by: INTERNAL MEDICINE

## 2023-01-07 RX ADMIN — SODIUM CHLORIDE SCH ML: 9 INJECTION INTRAMUSCULAR; INTRAVENOUS; SUBCUTANEOUS at 12:11

## 2023-01-07 RX ADMIN — POTASSIUM CHLORIDE SCH MLS/HR: 200 INJECTION, SOLUTION INTRAVENOUS at 22:43

## 2023-01-07 RX ADMIN — SODIUM CHLORIDE SCH ML: 9 INJECTION INTRAMUSCULAR; INTRAVENOUS; SUBCUTANEOUS at 14:06

## 2023-01-07 RX ADMIN — POTASSIUM CHLORIDE SCH MLS/HR: 200 INJECTION, SOLUTION INTRAVENOUS at 16:53

## 2023-01-07 RX ADMIN — POTASSIUM CHLORIDE SCH MLS/HR: 200 INJECTION, SOLUTION INTRAVENOUS at 20:19

## 2023-01-07 RX ADMIN — CIPROFLOXACIN SCH MLS/HR: 2 INJECTION, SOLUTION INTRAVENOUS at 12:20

## 2023-01-07 RX ADMIN — SODIUM CHLORIDE SCH ML: 9 INJECTION INTRAMUSCULAR; INTRAVENOUS; SUBCUTANEOUS at 23:26

## 2023-01-08 VITALS — SYSTOLIC BLOOD PRESSURE: 108 MMHG | DIASTOLIC BLOOD PRESSURE: 48 MMHG

## 2023-01-08 VITALS — SYSTOLIC BLOOD PRESSURE: 109 MMHG | DIASTOLIC BLOOD PRESSURE: 49 MMHG

## 2023-01-08 VITALS — DIASTOLIC BLOOD PRESSURE: 46 MMHG | SYSTOLIC BLOOD PRESSURE: 107 MMHG

## 2023-01-08 VITALS — SYSTOLIC BLOOD PRESSURE: 106 MMHG | DIASTOLIC BLOOD PRESSURE: 30 MMHG

## 2023-01-08 VITALS — SYSTOLIC BLOOD PRESSURE: 109 MMHG | DIASTOLIC BLOOD PRESSURE: 58 MMHG

## 2023-01-08 VITALS — DIASTOLIC BLOOD PRESSURE: 37 MMHG | SYSTOLIC BLOOD PRESSURE: 99 MMHG

## 2023-01-08 LAB
ALBUMIN SERPL-MCNC: 2.6 G/DL (ref 3.4–5)
ALP SERPL-CCNC: 79 U/L (ref 45–117)
ALT SERPL-CCNC: 7 U/L (ref 16–61)
ANION GAP SERPL CALCULATED.3IONS-SCNC: 11 MMOL/L (ref 5–15)
BILIRUB SERPL-MCNC: 0.4 MG/DL (ref 0.2–1)
BUN SERPL-MCNC: 29 MG/DL (ref 7–18)
BUN/CREAT SERPL: 17.9
CALCIUM SERPL-MCNC: 8.3 MG/DL (ref 8.5–10.1)
CHLORIDE SERPL-SCNC: 115 MMOL/L (ref 98–107)
CO2 SERPL-SCNC: 19 MMOL/L (ref 21–32)
GLUCOSE SERPL-MCNC: 79 MG/DL (ref 74–106)
HCT VFR BLD AUTO: 34.2 % (ref 41–53)
HGB BLD-MCNC: 11.3 G/DL (ref 13.5–17.5)
MAGNESIUM SERPL-MCNC: 2.7 MG/DL (ref 1.6–2.6)
MCH RBC QN AUTO: 27.2 PG (ref 28–32)
MCV RBC AUTO: 82.4 FL (ref 80–100)
NRBC BLD QL AUTO: 0.1 %
POTASSIUM SERPL-SCNC: 4 MMOL/L (ref 3.5–5.1)
PROT SERPL-MCNC: 6.2 G/DL (ref 6.4–8.2)
SODIUM SERPL-SCNC: 145 MMOL/L (ref 136–145)
WBC CLUMPS UR QL AUTO: PRESENT /HPF

## 2023-01-08 RX ADMIN — SODIUM CHLORIDE SCH ML: 9 INJECTION INTRAMUSCULAR; INTRAVENOUS; SUBCUTANEOUS at 21:44

## 2023-01-08 RX ADMIN — SODIUM CHLORIDE SCH ML: 9 INJECTION INTRAMUSCULAR; INTRAVENOUS; SUBCUTANEOUS at 05:49

## 2023-01-08 RX ADMIN — CIPROFLOXACIN SCH MLS/HR: 2 INJECTION, SOLUTION INTRAVENOUS at 10:30

## 2023-01-08 RX ADMIN — SODIUM CHLORIDE SCH ML: 9 INJECTION INTRAMUSCULAR; INTRAVENOUS; SUBCUTANEOUS at 13:39

## 2023-01-08 RX ADMIN — SODIUM CHLORIDE SCH MLS/HR: 0.9 INJECTION, SOLUTION INTRAVENOUS at 13:16

## 2023-01-09 VITALS — DIASTOLIC BLOOD PRESSURE: 67 MMHG | SYSTOLIC BLOOD PRESSURE: 118 MMHG

## 2023-01-09 VITALS — SYSTOLIC BLOOD PRESSURE: 113 MMHG | DIASTOLIC BLOOD PRESSURE: 53 MMHG

## 2023-01-09 VITALS — DIASTOLIC BLOOD PRESSURE: 44 MMHG | SYSTOLIC BLOOD PRESSURE: 106 MMHG

## 2023-01-09 VITALS — SYSTOLIC BLOOD PRESSURE: 116 MMHG | DIASTOLIC BLOOD PRESSURE: 45 MMHG

## 2023-01-09 VITALS — DIASTOLIC BLOOD PRESSURE: 57 MMHG | SYSTOLIC BLOOD PRESSURE: 116 MMHG

## 2023-01-09 LAB
ALBUMIN SERPL-MCNC: 2.2 G/DL (ref 3.4–5)
ALP SERPL-CCNC: 71 U/L (ref 45–117)
ALT SERPL-CCNC: 9 U/L (ref 16–61)
ANION GAP SERPL CALCULATED.3IONS-SCNC: 14 MMOL/L (ref 5–15)
BILIRUB SERPL-MCNC: 0.4 MG/DL (ref 0.2–1)
BUN SERPL-MCNC: 19 MG/DL (ref 7–18)
BUN/CREAT SERPL: 15
CALCIUM SERPL-MCNC: 8.1 MG/DL (ref 8.5–10.1)
CHLORIDE SERPL-SCNC: 119 MMOL/L (ref 98–107)
CO2 SERPL-SCNC: 13 MMOL/L (ref 21–32)
GLUCOSE SERPL-MCNC: 65 MG/DL (ref 74–106)
HCT VFR BLD AUTO: 35.6 % (ref 41–53)
HGB BLD-MCNC: 11.3 G/DL (ref 13.5–17.5)
MCH RBC QN AUTO: 26.2 PG (ref 28–32)
MCV RBC AUTO: 82.6 FL (ref 80–100)
NRBC BLD QL AUTO: 0.2 %
POTASSIUM SERPL-SCNC: 4 MMOL/L (ref 3.5–5.1)
PROT SERPL-MCNC: 6.1 G/DL (ref 6.4–8.2)
SODIUM SERPL-SCNC: 146 MMOL/L (ref 136–145)

## 2023-01-09 PROCEDURE — 0D9670Z DRAINAGE OF STOMACH WITH DRAINAGE DEVICE, VIA NATURAL OR ARTIFICIAL OPENING: ICD-10-PCS | Performed by: INTERNAL MEDICINE

## 2023-01-09 RX ADMIN — SODIUM CHLORIDE SCH ML: 9 INJECTION INTRAMUSCULAR; INTRAVENOUS; SUBCUTANEOUS at 22:06

## 2023-01-09 RX ADMIN — SODIUM CHLORIDE SCH MLS/HR: 0.9 INJECTION, SOLUTION INTRAVENOUS at 02:20

## 2023-01-09 RX ADMIN — SODIUM CHLORIDE SCH MLS/HR: 0.9 INJECTION, SOLUTION INTRAVENOUS at 04:31

## 2023-01-09 RX ADMIN — CIPROFLOXACIN SCH MLS/HR: 2 INJECTION, SOLUTION INTRAVENOUS at 09:27

## 2023-01-09 RX ADMIN — SODIUM CHLORIDE SCH ML: 9 INJECTION INTRAMUSCULAR; INTRAVENOUS; SUBCUTANEOUS at 14:00

## 2023-01-09 RX ADMIN — SODIUM CHLORIDE SCH ML: 9 INJECTION INTRAMUSCULAR; INTRAVENOUS; SUBCUTANEOUS at 05:50

## 2023-01-10 VITALS — SYSTOLIC BLOOD PRESSURE: 118 MMHG | DIASTOLIC BLOOD PRESSURE: 60 MMHG

## 2023-01-10 VITALS — SYSTOLIC BLOOD PRESSURE: 129 MMHG | DIASTOLIC BLOOD PRESSURE: 40 MMHG

## 2023-01-10 VITALS — DIASTOLIC BLOOD PRESSURE: 56 MMHG | SYSTOLIC BLOOD PRESSURE: 112 MMHG

## 2023-01-10 VITALS — DIASTOLIC BLOOD PRESSURE: 57 MMHG | SYSTOLIC BLOOD PRESSURE: 142 MMHG

## 2023-01-10 VITALS — SYSTOLIC BLOOD PRESSURE: 127 MMHG | DIASTOLIC BLOOD PRESSURE: 54 MMHG

## 2023-01-10 LAB
ALBUMIN SERPL-MCNC: 2.4 G/DL (ref 3.4–5)
ALP SERPL-CCNC: 69 U/L (ref 45–117)
ALT SERPL-CCNC: 8 U/L (ref 16–61)
ANION GAP SERPL CALCULATED.3IONS-SCNC: 11 MMOL/L (ref 5–15)
ANION GAP SERPL CALCULATED.3IONS-SCNC: 16 MMOL/L (ref 5–15)
BILIRUB SERPL-MCNC: 0.4 MG/DL (ref 0.2–1)
BUN SERPL-MCNC: 11 MG/DL (ref 7–18)
BUN SERPL-MCNC: 12 MG/DL (ref 7–18)
BUN/CREAT SERPL: 8.5
BUN/CREAT SERPL: 8.7
CALCIUM SERPL-MCNC: 8.1 MG/DL (ref 8.5–10.1)
CALCIUM SERPL-MCNC: 8.1 MG/DL (ref 8.5–10.1)
CHLORIDE SERPL-SCNC: 128 MMOL/L (ref 98–107)
CHLORIDE SERPL-SCNC: 131 MMOL/L (ref 98–107)
CO2 SERPL-SCNC: 10 MMOL/L (ref 21–32)
CO2 SERPL-SCNC: 13 MMOL/L (ref 21–32)
GLUCOSE SERPL-MCNC: 133 MG/DL (ref 74–106)
GLUCOSE SERPL-MCNC: 82 MG/DL (ref 74–106)
HCT VFR BLD AUTO: 38.7 % (ref 41–53)
HGB BLD-MCNC: 11.6 G/DL (ref 13.5–17.5)
MCH RBC QN AUTO: 26.6 PG (ref 28–32)
MCV RBC AUTO: 89.1 FL (ref 80–100)
NRBC BLD QL AUTO: 0.1 %
POTASSIUM SERPL-SCNC: 2.9 MMOL/L (ref 3.5–5.1)
POTASSIUM SERPL-SCNC: 3.7 MMOL/L (ref 3.5–5.1)
PROT SERPL-MCNC: 5.7 G/DL (ref 6.4–8.2)
SODIUM SERPL-SCNC: 152 MMOL/L (ref 136–145)
SODIUM SERPL-SCNC: 157 MMOL/L (ref 136–145)

## 2023-01-10 RX ADMIN — SODIUM CHLORIDE SCH ML: 9 INJECTION INTRAMUSCULAR; INTRAVENOUS; SUBCUTANEOUS at 14:00

## 2023-01-10 RX ADMIN — CIPROFLOXACIN SCH MLS/HR: 2 INJECTION, SOLUTION INTRAVENOUS at 09:01

## 2023-01-10 RX ADMIN — SODIUM CHLORIDE SCH ML: 9 INJECTION INTRAMUSCULAR; INTRAVENOUS; SUBCUTANEOUS at 05:30

## 2023-01-10 RX ADMIN — DOCUSATE SODIUM SCH MG: 100 CAPSULE, LIQUID FILLED ORAL at 21:36

## 2023-01-10 RX ADMIN — SODIUM CHLORIDE SCH ML: 9 INJECTION INTRAMUSCULAR; INTRAVENOUS; SUBCUTANEOUS at 21:36

## 2023-01-10 RX ADMIN — DEXTROSE AND SODIUM CHLORIDE SCH MLS/HR: 5; 450 INJECTION, SOLUTION INTRAVENOUS at 11:55

## 2023-01-11 VITALS — DIASTOLIC BLOOD PRESSURE: 58 MMHG | SYSTOLIC BLOOD PRESSURE: 118 MMHG

## 2023-01-11 VITALS — DIASTOLIC BLOOD PRESSURE: 51 MMHG | SYSTOLIC BLOOD PRESSURE: 109 MMHG

## 2023-01-11 VITALS — DIASTOLIC BLOOD PRESSURE: 42 MMHG | SYSTOLIC BLOOD PRESSURE: 99 MMHG

## 2023-01-11 VITALS — SYSTOLIC BLOOD PRESSURE: 129 MMHG | DIASTOLIC BLOOD PRESSURE: 47 MMHG

## 2023-01-11 VITALS — SYSTOLIC BLOOD PRESSURE: 118 MMHG | DIASTOLIC BLOOD PRESSURE: 62 MMHG

## 2023-01-11 LAB
ALBUMIN SERPL-MCNC: 2.1 G/DL (ref 3.4–5)
ALP SERPL-CCNC: 60 U/L (ref 45–117)
ALT SERPL-CCNC: 7 U/L (ref 16–61)
ANION GAP SERPL CALCULATED.3IONS-SCNC: 6 MMOL/L (ref 5–15)
BILIRUB SERPL-MCNC: 0.4 MG/DL (ref 0.2–1)
BUN SERPL-MCNC: 7 MG/DL (ref 7–18)
BUN/CREAT SERPL: 5.8
CALCIUM SERPL-MCNC: 7.6 MG/DL (ref 8.5–10.1)
CHLORIDE SERPL-SCNC: 128 MMOL/L (ref 98–107)
CO2 SERPL-SCNC: 17 MMOL/L (ref 21–32)
GLUCOSE SERPL-MCNC: 163 MG/DL (ref 74–106)
HCT VFR BLD AUTO: 32.1 % (ref 41–53)
HGB BLD-MCNC: 10.5 G/DL (ref 13.5–17.5)
MAGNESIUM SERPL-MCNC: 2.4 MG/DL (ref 1.6–2.6)
MCH RBC QN AUTO: 26.2 PG (ref 28–32)
MCV RBC AUTO: 80.3 FL (ref 80–100)
NRBC BLD QL AUTO: 0 %
POTASSIUM SERPL-SCNC: 3.4 MMOL/L (ref 3.5–5.1)
PROT SERPL-MCNC: 4.9 G/DL (ref 6.4–8.2)
SODIUM SERPL-SCNC: 151 MMOL/L (ref 136–145)

## 2023-01-11 RX ADMIN — SODIUM CHLORIDE SCH ML: 9 INJECTION INTRAMUSCULAR; INTRAVENOUS; SUBCUTANEOUS at 14:00

## 2023-01-11 RX ADMIN — DEXTROSE SCH MLS/HR: 5 SOLUTION INTRAVENOUS at 17:56

## 2023-01-11 RX ADMIN — DEXTROSE SCH MLS/HR: 5 SOLUTION INTRAVENOUS at 22:00

## 2023-01-11 RX ADMIN — DOCUSATE SODIUM SCH MG: 100 CAPSULE, LIQUID FILLED ORAL at 21:39

## 2023-01-11 RX ADMIN — DOCUSATE SODIUM SCH MG: 100 CAPSULE, LIQUID FILLED ORAL at 10:04

## 2023-01-11 RX ADMIN — SODIUM CHLORIDE SCH ML: 9 INJECTION INTRAMUSCULAR; INTRAVENOUS; SUBCUTANEOUS at 21:39

## 2023-01-11 RX ADMIN — SODIUM CHLORIDE SCH ML: 9 INJECTION INTRAMUSCULAR; INTRAVENOUS; SUBCUTANEOUS at 06:09

## 2023-01-11 RX ADMIN — CIPROFLOXACIN SCH MLS/HR: 2 INJECTION, SOLUTION INTRAVENOUS at 10:04

## 2023-01-11 RX ADMIN — ALBUTEROL SULFATE SCH MG: 2.5 SOLUTION RESPIRATORY (INHALATION) at 12:45

## 2023-01-11 RX ADMIN — DEXTROSE AND SODIUM CHLORIDE SCH MLS/HR: 5; 450 INJECTION, SOLUTION INTRAVENOUS at 02:11

## 2023-01-11 RX ADMIN — ALBUTEROL SULFATE SCH MG: 2.5 SOLUTION RESPIRATORY (INHALATION) at 21:06

## 2023-01-12 VITALS — SYSTOLIC BLOOD PRESSURE: 97 MMHG | DIASTOLIC BLOOD PRESSURE: 36 MMHG

## 2023-01-12 VITALS — DIASTOLIC BLOOD PRESSURE: 33 MMHG | SYSTOLIC BLOOD PRESSURE: 93 MMHG

## 2023-01-12 VITALS — SYSTOLIC BLOOD PRESSURE: 104 MMHG | DIASTOLIC BLOOD PRESSURE: 41 MMHG

## 2023-01-12 VITALS — DIASTOLIC BLOOD PRESSURE: 34 MMHG | SYSTOLIC BLOOD PRESSURE: 103 MMHG

## 2023-01-12 VITALS — SYSTOLIC BLOOD PRESSURE: 114 MMHG | DIASTOLIC BLOOD PRESSURE: 48 MMHG

## 2023-01-12 LAB
ALBUMIN SERPL-MCNC: 1.8 G/DL (ref 3.4–5)
ALP SERPL-CCNC: 53 U/L (ref 45–117)
ALT SERPL-CCNC: 12 U/L (ref 16–61)
ANION GAP SERPL CALCULATED.3IONS-SCNC: 6 MMOL/L (ref 5–15)
APTT PPP: 52.1 SEC (ref 24.6–33.4)
BILIRUB SERPL-MCNC: 0.3 MG/DL (ref 0.2–1)
BUN SERPL-MCNC: 6 MG/DL (ref 7–18)
BUN/CREAT SERPL: 4.3
CALCIUM SERPL-MCNC: 7.2 MG/DL (ref 8.5–10.1)
CHLORIDE SERPL-SCNC: 126 MMOL/L (ref 98–107)
CO2 SERPL-SCNC: 21 MMOL/L (ref 21–32)
GLUCOSE SERPL-MCNC: 116 MG/DL (ref 74–106)
HCT VFR BLD AUTO: 29.3 % (ref 41–53)
HGB BLD-MCNC: 9.5 G/DL (ref 13.5–17.5)
INR PPP: 2.26 (ref 0.9–1.15)
MAGNESIUM SERPL-MCNC: 2 MG/DL (ref 1.6–2.6)
MCH RBC QN AUTO: 26.5 PG (ref 28–32)
MCV RBC AUTO: 81.8 FL (ref 80–100)
NRBC BLD QL AUTO: 0.1 %
POTASSIUM SERPL-SCNC: 2.9 MMOL/L (ref 3.5–5.1)
POTASSIUM SERPL-SCNC: 2.9 MMOL/L (ref 3.5–5.1)
PROT SERPL-MCNC: 4.5 G/DL (ref 6.4–8.2)
SODIUM SERPL-SCNC: 153 MMOL/L (ref 136–145)
YEAST # UR AUTO: (no result) /HPF

## 2023-01-12 RX ADMIN — CEFTRIAXONE SODIUM SCH MLS/HR: 1 INJECTION, POWDER, FOR SOLUTION INTRAMUSCULAR; INTRAVENOUS at 09:55

## 2023-01-12 RX ADMIN — DOCUSATE SODIUM SCH MG: 100 CAPSULE, LIQUID FILLED ORAL at 21:45

## 2023-01-12 RX ADMIN — ALBUMIN (HUMAN) SCH MLS/HR: 0.25 INJECTION, SOLUTION INTRAVENOUS at 16:18

## 2023-01-12 RX ADMIN — SODIUM CHLORIDE SCH ML: 9 INJECTION INTRAMUSCULAR; INTRAVENOUS; SUBCUTANEOUS at 21:46

## 2023-01-12 RX ADMIN — SODIUM CHLORIDE SCH ML: 9 INJECTION INTRAMUSCULAR; INTRAVENOUS; SUBCUTANEOUS at 06:22

## 2023-01-12 RX ADMIN — ALBUMIN (HUMAN) SCH MLS/HR: 0.25 INJECTION, SOLUTION INTRAVENOUS at 23:35

## 2023-01-12 RX ADMIN — DOCUSATE SODIUM SCH MG: 100 CAPSULE, LIQUID FILLED ORAL at 09:55

## 2023-01-12 RX ADMIN — ALBUTEROL SULFATE SCH MG: 2.5 SOLUTION RESPIRATORY (INHALATION) at 12:28

## 2023-01-12 RX ADMIN — ALBUTEROL SULFATE SCH MG: 2.5 SOLUTION RESPIRATORY (INHALATION) at 18:12

## 2023-01-12 RX ADMIN — SODIUM CHLORIDE SCH ML: 9 INJECTION INTRAMUSCULAR; INTRAVENOUS; SUBCUTANEOUS at 14:00

## 2023-01-12 RX ADMIN — ALBUMIN (HUMAN) SCH MLS/HR: 0.25 INJECTION, SOLUTION INTRAVENOUS at 10:45

## 2023-01-12 RX ADMIN — ALBUTEROL SULFATE SCH MG: 2.5 SOLUTION RESPIRATORY (INHALATION) at 06:50

## 2023-01-13 VITALS — SYSTOLIC BLOOD PRESSURE: 105 MMHG | DIASTOLIC BLOOD PRESSURE: 36 MMHG

## 2023-01-13 VITALS — SYSTOLIC BLOOD PRESSURE: 118 MMHG | DIASTOLIC BLOOD PRESSURE: 54 MMHG

## 2023-01-13 VITALS — DIASTOLIC BLOOD PRESSURE: 42 MMHG | SYSTOLIC BLOOD PRESSURE: 104 MMHG

## 2023-01-13 VITALS — DIASTOLIC BLOOD PRESSURE: 34 MMHG | SYSTOLIC BLOOD PRESSURE: 103 MMHG

## 2023-01-13 VITALS — SYSTOLIC BLOOD PRESSURE: 104 MMHG | DIASTOLIC BLOOD PRESSURE: 56 MMHG

## 2023-01-13 LAB
ANION GAP SERPL CALCULATED.3IONS-SCNC: 5 MMOL/L (ref 5–15)
APTT PPP: 62.9 SEC (ref 24.6–33.4)
BUN SERPL-MCNC: 5 MG/DL (ref 7–18)
BUN/CREAT SERPL: 3.8
CALCIUM SERPL-MCNC: 7.4 MG/DL (ref 8.5–10.1)
CHLORIDE SERPL-SCNC: 131 MMOL/L (ref 98–107)
CO2 SERPL-SCNC: 18 MMOL/L (ref 21–32)
GLUCOSE SERPL-MCNC: 113 MG/DL (ref 74–106)
INR PPP: 2.16 (ref 0.9–1.15)
MAGNESIUM SERPL-MCNC: 2.1 MG/DL (ref 1.6–2.6)
POTASSIUM SERPL-SCNC: 4 MMOL/L (ref 3.5–5.1)
SODIUM SERPL-SCNC: 154 MMOL/L (ref 136–145)

## 2023-01-13 RX ADMIN — DOCUSATE SODIUM SCH MG: 100 CAPSULE, LIQUID FILLED ORAL at 10:28

## 2023-01-13 RX ADMIN — SODIUM CHLORIDE SCH ML: 9 INJECTION INTRAMUSCULAR; INTRAVENOUS; SUBCUTANEOUS at 05:34

## 2023-01-13 RX ADMIN — ALBUTEROL SULFATE SCH MG: 2.5 SOLUTION RESPIRATORY (INHALATION) at 12:35

## 2023-01-13 RX ADMIN — DEXTROSE SCH MLS/HR: 5 SOLUTION INTRAVENOUS at 13:10

## 2023-01-13 RX ADMIN — SODIUM CHLORIDE SCH ML: 9 INJECTION INTRAMUSCULAR; INTRAVENOUS; SUBCUTANEOUS at 14:09

## 2023-01-13 RX ADMIN — CEFTRIAXONE SODIUM SCH MLS/HR: 1 INJECTION, POWDER, FOR SOLUTION INTRAMUSCULAR; INTRAVENOUS at 10:28

## 2023-01-13 RX ADMIN — DOCUSATE SODIUM SCH MG: 100 CAPSULE, LIQUID FILLED ORAL at 10:00

## 2023-01-13 RX ADMIN — SODIUM CHLORIDE SCH ML: 9 INJECTION INTRAMUSCULAR; INTRAVENOUS; SUBCUTANEOUS at 21:10

## 2023-01-13 RX ADMIN — ALBUTEROL SULFATE SCH MG: 2.5 SOLUTION RESPIRATORY (INHALATION) at 07:22

## 2023-01-13 RX ADMIN — ALBUTEROL SULFATE SCH MG: 2.5 SOLUTION RESPIRATORY (INHALATION) at 19:29

## 2023-01-13 RX ADMIN — DOCUSATE SODIUM SCH MG: 100 CAPSULE, LIQUID FILLED ORAL at 21:10

## 2023-01-14 VITALS — SYSTOLIC BLOOD PRESSURE: 116 MMHG | DIASTOLIC BLOOD PRESSURE: 72 MMHG

## 2023-01-14 VITALS — SYSTOLIC BLOOD PRESSURE: 127 MMHG | DIASTOLIC BLOOD PRESSURE: 61 MMHG

## 2023-01-14 LAB
ANION GAP SERPL CALCULATED.3IONS-SCNC: 8 MMOL/L (ref 5–15)
BUN SERPL-MCNC: 9 MG/DL (ref 7–18)
BUN/CREAT SERPL: 7.5
CALCIUM SERPL-MCNC: 7.5 MG/DL (ref 8.5–10.1)
CHLORIDE SERPL-SCNC: 127 MMOL/L (ref 98–107)
CO2 SERPL-SCNC: 19 MMOL/L (ref 21–32)
GLUCOSE SERPL-MCNC: 95 MG/DL (ref 74–106)
POTASSIUM SERPL-SCNC: 3.4 MMOL/L (ref 3.5–5.1)
SODIUM SERPL-SCNC: 154 MMOL/L (ref 136–145)

## 2023-01-14 RX ADMIN — DEXTROSE SCH MLS/HR: 5 SOLUTION INTRAVENOUS at 05:45

## 2023-01-14 RX ADMIN — DOCUSATE SODIUM SCH MG: 100 CAPSULE, LIQUID FILLED ORAL at 09:45

## 2023-01-14 RX ADMIN — SODIUM CHLORIDE SCH ML: 9 INJECTION INTRAMUSCULAR; INTRAVENOUS; SUBCUTANEOUS at 05:06

## 2023-01-14 RX ADMIN — CEFTRIAXONE SODIUM SCH MLS/HR: 1 INJECTION, POWDER, FOR SOLUTION INTRAMUSCULAR; INTRAVENOUS at 08:31

## 2023-01-14 RX ADMIN — ALBUTEROL SULFATE SCH MG: 2.5 SOLUTION RESPIRATORY (INHALATION) at 07:49
